# Patient Record
Sex: FEMALE | Race: BLACK OR AFRICAN AMERICAN | NOT HISPANIC OR LATINO | Employment: FULL TIME | URBAN - METROPOLITAN AREA
[De-identification: names, ages, dates, MRNs, and addresses within clinical notes are randomized per-mention and may not be internally consistent; named-entity substitution may affect disease eponyms.]

---

## 2017-07-17 ENCOUNTER — TELEPHONE (OUTPATIENT)
Dept: OBSTETRICS AND GYNECOLOGY | Facility: CLINIC | Age: 42
End: 2017-07-17

## 2017-07-17 NOTE — TELEPHONE ENCOUNTER
----- Message from Aziza Zelaya sent at 7/17/2017  3:47 PM CDT -----  Contact: self  Pt returning a missed call, she can be reached at [phone].

## 2017-07-17 NOTE — TELEPHONE ENCOUNTER
Returned pt call regarding scheduling her annual exam.  No answer.  Left VM message to return call to clinic.

## 2017-07-17 NOTE — TELEPHONE ENCOUNTER
----- Message from Randee Wadsworth sent at 7/17/2017 10:29 AM CDT -----  Contact: Pt  Pt is calling to schedule annual pap and nothing was available to schedule.  Pt can be reached at [phone].    Thank you

## 2017-07-17 NOTE — TELEPHONE ENCOUNTER
Returned pt call regarding scheduling an annual appt.  Informed pt of appt date and time.  Pt verbalized understanding.  Letter sent out.

## 2017-07-19 ENCOUNTER — TELEPHONE (OUTPATIENT)
Dept: OBSTETRICS AND GYNECOLOGY | Facility: CLINIC | Age: 42
End: 2017-07-19

## 2017-07-19 DIAGNOSIS — N92.0 MENORRHAGIA: ICD-10-CM

## 2017-07-19 NOTE — TELEPHONE ENCOUNTER
----- Message from Aziza Zelaya sent at 7/19/2017  8:48 AM CDT -----  Contact: self  Pt needing her depo Rx refilled, pt use [facility] at [facility] and [place], she can be reached at [phone].

## 2017-07-19 NOTE — TELEPHONE ENCOUNTER
Called pt's pharmacy regarding refills on her depo injection.  Spoke to Katherine at [facility] and instructed her to dispense one refill until the pt comes in for her annual.  Katherine verbalized understanding.  Called pt to inform her of the above info.  Pt verbalized understanding.

## 2017-07-23 RX ORDER — MEDROXYPROGESTERONE ACETATE 150 MG/ML
INJECTION, SUSPENSION INTRAMUSCULAR
Qty: 1 ML | Refills: 0 | Status: SHIPPED | OUTPATIENT
Start: 2017-07-23 | End: 2018-02-16

## 2017-07-24 ENCOUNTER — CLINICAL SUPPORT (OUTPATIENT)
Dept: OBSTETRICS AND GYNECOLOGY | Facility: CLINIC | Age: 42
End: 2017-07-24
Payer: COMMERCIAL

## 2017-07-24 DIAGNOSIS — Z30.42 ON DEPO-PROVERA FOR CONTRACEPTION: Primary | ICD-10-CM

## 2017-07-24 LAB
B-HCG UR QL: NEGATIVE
CTP QC/QA: YES

## 2017-07-24 PROCEDURE — 96372 THER/PROPH/DIAG INJ SC/IM: CPT | Mod: S$GLB,,, | Performed by: OBSTETRICS & GYNECOLOGY

## 2017-07-24 PROCEDURE — 99999 PR PBB SHADOW E&M-EST. PATIENT-LVL I: CPT | Mod: PBBFAC,,,

## 2017-07-24 PROCEDURE — 81025 URINE PREGNANCY TEST: CPT | Mod: QW,S$GLB,, | Performed by: OBSTETRICS & GYNECOLOGY

## 2017-07-24 RX ADMIN — MEDROXYPROGESTERONE ACETATE 150 MG: 150 INJECTION, SUSPENSION INTRAMUSCULAR at 09:07

## 2017-07-24 NOTE — PROGRESS NOTES
Here for Depo Provera Injection, Pregnancy test performed, results negative . Patient with no current complaints of pain prior to or after injection. Advised to wait 5 minutes. Return between October 9 - 23 /2017 for next injection.  PATIENT SUPPLIED MEDICATION.  See medication Card for RX information  Order verified, inspected package,storage verified,expiration verified      Site - [facility]

## 2017-08-17 ENCOUNTER — TELEPHONE (OUTPATIENT)
Dept: OBSTETRICS AND GYNECOLOGY | Facility: CLINIC | Age: 42
End: 2017-08-17

## 2017-08-17 NOTE — TELEPHONE ENCOUNTER
Returned pt call regarding whether or not she can still come in ofr her appt if her cycle has started.  Informed pt that she is not due for a pap smear until 2018 and that she can still come in for her exam.  Pt verbalized understanding.

## 2017-08-17 NOTE — TELEPHONE ENCOUNTER
----- Message from Aziza Zelaya sent at 8/17/2017  4:25 PM CDT -----  Contact: self  Pt needing a call back, she have questions, and can be reached at [phone].

## 2017-08-18 ENCOUNTER — OFFICE VISIT (OUTPATIENT)
Dept: OBSTETRICS AND GYNECOLOGY | Facility: CLINIC | Age: 42
End: 2017-08-18
Attending: OBSTETRICS & GYNECOLOGY
Payer: COMMERCIAL

## 2017-08-18 VITALS
BODY MASS INDEX: 26.54 KG/M2 | DIASTOLIC BLOOD PRESSURE: 70 MMHG | WEIGHT: 165.13 LBS | SYSTOLIC BLOOD PRESSURE: 112 MMHG | HEIGHT: 66 IN

## 2017-08-18 DIAGNOSIS — Z12.31 ENCOUNTER FOR SCREENING MAMMOGRAM FOR BREAST CANCER: ICD-10-CM

## 2017-08-18 DIAGNOSIS — N92.1 MENORRHAGIA WITH IRREGULAR CYCLE: ICD-10-CM

## 2017-08-18 DIAGNOSIS — Z01.419 VISIT FOR GYNECOLOGIC EXAMINATION: Primary | ICD-10-CM

## 2017-08-18 PROCEDURE — 88141 CYTOPATH C/V INTERPRET: CPT | Mod: ,,, | Performed by: PATHOLOGY

## 2017-08-18 PROCEDURE — 87624 HPV HI-RISK TYP POOLED RSLT: CPT

## 2017-08-18 PROCEDURE — 99999 PR PBB SHADOW E&M-EST. PATIENT-LVL III: CPT | Mod: PBBFAC,,, | Performed by: OBSTETRICS & GYNECOLOGY

## 2017-08-18 PROCEDURE — 99396 PREV VISIT EST AGE 40-64: CPT | Mod: S$GLB,,, | Performed by: OBSTETRICS & GYNECOLOGY

## 2017-08-18 PROCEDURE — 88175 CYTOPATH C/V AUTO FLUID REDO: CPT | Performed by: PATHOLOGY

## 2017-08-18 NOTE — PROGRESS NOTES
"CC: Well woman exam    Denisse Steen is a 42 y.o. female  presents for a well woman exam.      She is currently on DepoProvera for contraception and control of bleeding.  When she does not take it, she has heavy menses.  She is interested in surgical options.          Past Medical History:   Diagnosis Date    Abnormal Pap smear     Anemia     Anxiety 2013    Blood transfusion        Past Surgical History:   Procedure Laterality Date    ADENOIDECTOMY      CERVICAL BIOPSY  W/ LOOP ELECTRODE EXCISION      DILATION AND CURETTAGE OF UTERUS      OVARIAN CYST REMOVAL      TONSILLECTOMY      WISDOM TOOTH EXTRACTION         OB History    Para Term  AB Living   2 1     1 1   SAB TAB Ectopic Multiple Live Births   1       1      # Outcome Date GA Lbr Guzman/2nd Weight Sex Delivery Anes PTL Lv   2 SAB            1 Para      Vag-Spont   SAMOSN          Family History   Problem Relation Age of Onset    Cancer Sister      Cervical     Diabetes Mother     Cancer Father        Social History   Substance Use Topics    Smoking status: Never Smoker    Smokeless tobacco: Never Used    Alcohol use Yes      Comment: socially       /70   Ht 5' 6" (1.676 m)   Wt 74.9 kg (165 lb 2 oz)   LMP 2017   BMI 26.65 kg/m²     ROS:  GENERAL: Denies weight gain or weight loss. Feeling well overall.   SKIN: Denies rash or lesions.   HEAD: Denies head injury or headache.   NODES: Denies enlarged lymph nodes.   CHEST: Denies chest pain or shortness of breath.   CARDIOVASCULAR: Denies palpitations or left sided chest pain.   ABDOMEN: No abdominal pain, constipation, diarrhea, nausea, vomiting or rectal bleeding.   URINARY: No frequency, dysuria, hematuria, or burning on urination.  REPRODUCTIVE: See HPI.   BREASTS: The patient performs breast self-examination and denies pain, lumps, or nipple discharge.   HEMATOLOGIC: No easy bruisability or excessive bleeding.  MUSCULOSKELETAL: Denies joint pain or " swelling.   NEUROLOGIC: Denies syncope or weakness.   PSYCHIATRIC: Denies depression, anxiety or mood swings.    Physical Exam:    APPEARANCE: Well nourished, well developed, in no acute distress.  AFFECT: WNL, alert and oriented x 3  SKIN: No acne or hirsutism  NECK: Neck symmetric without masses or thyromegaly  NODES: No inguinal, cervical, axillary, or femoral lymph node enlargement  CHEST: Good respiratory effect  ABDOMEN: Soft.  No tenderness or masses.  No hepatosplenomegaly.  No hernias.  BREASTS: Symmetrical, no skin changes or visible lesions.  No palpable masses, nipple discharge bilaterally.  PELVIC: Normal external genitalia without lesions.  Normal hair distribution.  Adequate perineal body, normal urethral meatus.  Vagina moist and well rugated without lesions or discharge.  Cervix pink, without lesions, discharge or tenderness.  No significant cystocele or rectocele.  Bimanual exam shows uterus to be normal size, regular, mobile and nontender.  Adnexa without masses or tenderness.    EXTREMITIES: No edema.    ASSESSMENT AND PLAN  1. Visit for gynecologic examination  Liquid-based pap smear, screening    HPV High Risk Genotypes, PCR   2. Encounter for screening mammogram for breast cancer  Mammo Digital Screening Bilat with CAD   3. Menorrhagia with irregular cycle  US Pelvis Comp with Transvag NON-OB (xpd       Patient was counseled today on A.C.S. Pap guidelines and recommendations for yearly pelvic exams, pap smears every year due to h/o cervical dysplasia, mammograms and monthly self breast exams; to see her PCP for other health maintenance.     Briefly discussed treatment options.  Last ultrasound was in 2011, will repeat and make management decision based on ultrasound findings.     Return in about 1 year (around 8/18/2018).

## 2017-08-23 LAB
HPV HR 12 DNA CVX QL NAA+PROBE: POSITIVE
HPV16 DNA SPEC QL NAA+PROBE: NEGATIVE
HPV18 DNA SPEC QL NAA+PROBE: NEGATIVE

## 2017-09-06 ENCOUNTER — TELEPHONE (OUTPATIENT)
Dept: OBSTETRICS AND GYNECOLOGY | Facility: CLINIC | Age: 42
End: 2017-09-06

## 2017-09-06 NOTE — TELEPHONE ENCOUNTER
Attempted to contact pt regarding abnormal pap results. No answer, left VM message for the pt to call the clinic back.

## 2017-09-14 ENCOUNTER — TELEPHONE (OUTPATIENT)
Dept: OBSTETRICS AND GYNECOLOGY | Facility: CLINIC | Age: 42
End: 2017-09-14

## 2017-09-14 NOTE — TELEPHONE ENCOUNTER
----- Message from Sarah Cross MD sent at 9/14/2017 11:36 AM CDT -----  Please continue to call the patient to schedule colposcopy.

## 2017-09-14 NOTE — TELEPHONE ENCOUNTER
Contacted pt to schedule colposcopy. Pt informed of her pap results and the purpose of the procedure. Pt given next available appointment with Dr. Cross. Pt instructed to contact the clinic if she has any further questions. Pt verbalized understanding.

## 2017-09-18 ENCOUNTER — HOSPITAL ENCOUNTER (OUTPATIENT)
Dept: RADIOLOGY | Facility: OTHER | Age: 42
Discharge: HOME | End: 2017-09-18
Attending: OBSTETRICS & GYNECOLOGY
Payer: COMMERCIAL

## 2017-09-18 DIAGNOSIS — Z12.31 ENCOUNTER FOR SCREENING MAMMOGRAM FOR BREAST CANCER: ICD-10-CM

## 2017-09-18 DIAGNOSIS — N92.1 MENORRHAGIA WITH IRREGULAR CYCLE: ICD-10-CM

## 2017-09-18 PROCEDURE — 76856 US EXAM PELVIC COMPLETE: CPT | Mod: 26,,, | Performed by: INTERNAL MEDICINE

## 2017-09-18 PROCEDURE — 77067 SCR MAMMO BI INCL CAD: CPT | Mod: TC

## 2017-09-18 PROCEDURE — 76830 TRANSVAGINAL US NON-OB: CPT | Mod: 26,,, | Performed by: INTERNAL MEDICINE

## 2017-09-18 PROCEDURE — 77067 SCR MAMMO BI INCL CAD: CPT | Mod: 26,,, | Performed by: RADIOLOGY

## 2017-09-18 PROCEDURE — 76856 US EXAM PELVIC COMPLETE: CPT | Mod: TC

## 2017-09-19 ENCOUNTER — TELEPHONE (OUTPATIENT)
Dept: OBSTETRICS AND GYNECOLOGY | Facility: CLINIC | Age: 42
End: 2017-09-19

## 2017-09-19 NOTE — TELEPHONE ENCOUNTER
Contacted pt regarding the results of her u/s informed the pt that her u/s shows small fibroids and pt was given Dr. Cross's recommendations. Offer the pt an appointment to consult Dr. Cross regarding he fibroids. Pt informed me that she has a colposcopy scheduled and that she will discuss with Dr. Cross on that visit. Pt instructed to contact the clinic if she has any questions. Pt verbalized understanding.

## 2017-10-11 DIAGNOSIS — N92.0 MENORRHAGIA: ICD-10-CM

## 2017-10-12 RX ORDER — MEDROXYPROGESTERONE ACETATE 150 MG/ML
INJECTION, SUSPENSION INTRAMUSCULAR
Qty: 1 ML | Refills: 0 | Status: SHIPPED | OUTPATIENT
Start: 2017-10-12 | End: 2018-01-04 | Stop reason: SDUPTHER

## 2017-10-30 ENCOUNTER — CLINICAL SUPPORT (OUTPATIENT)
Dept: OBSTETRICS AND GYNECOLOGY | Facility: CLINIC | Age: 42
End: 2017-10-30
Payer: COMMERCIAL

## 2017-10-30 ENCOUNTER — PROCEDURE VISIT (OUTPATIENT)
Dept: OBSTETRICS AND GYNECOLOGY | Facility: CLINIC | Age: 42
End: 2017-10-30
Attending: OBSTETRICS & GYNECOLOGY
Payer: COMMERCIAL

## 2017-10-30 VITALS
SYSTOLIC BLOOD PRESSURE: 114 MMHG | DIASTOLIC BLOOD PRESSURE: 76 MMHG | BODY MASS INDEX: 26.61 KG/M2 | WEIGHT: 165.56 LBS | HEIGHT: 66 IN

## 2017-10-30 DIAGNOSIS — R87.619 ABNORMAL CERVICAL PAPANICOLAOU SMEAR, UNSPECIFIED ABNORMAL PAP FINDING: ICD-10-CM

## 2017-10-30 DIAGNOSIS — R87.612 LGSIL ON PAP SMEAR OF CERVIX: Primary | ICD-10-CM

## 2017-10-30 DIAGNOSIS — Z30.42 ON DEPO-PROVERA FOR CONTRACEPTION: Primary | ICD-10-CM

## 2017-10-30 LAB
B-HCG UR QL: NEGATIVE
B-HCG UR QL: NEGATIVE
CTP QC/QA: YES
CTP QC/QA: YES

## 2017-10-30 PROCEDURE — 99999 PR PBB SHADOW E&M-EST. PATIENT-LVL I: CPT | Mod: PBBFAC,,,

## 2017-10-30 PROCEDURE — 96372 THER/PROPH/DIAG INJ SC/IM: CPT | Mod: S$GLB,,, | Performed by: OBSTETRICS & GYNECOLOGY

## 2017-10-30 PROCEDURE — 81025 URINE PREGNANCY TEST: CPT | Mod: S$GLB,,, | Performed by: OBSTETRICS & GYNECOLOGY

## 2017-10-30 PROCEDURE — 88305 TISSUE EXAM BY PATHOLOGIST: CPT | Performed by: PATHOLOGY

## 2017-10-30 PROCEDURE — 57456 ENDOCERV CURETTAGE W/SCOPE: CPT | Mod: S$GLB,,, | Performed by: OBSTETRICS & GYNECOLOGY

## 2017-10-30 PROCEDURE — 88305 TISSUE EXAM BY PATHOLOGIST: CPT | Mod: 26,,, | Performed by: PATHOLOGY

## 2017-10-30 RX ORDER — ISOTRETINOIN 40 MG/1
CAPSULE ORAL
COMMUNITY
Start: 2017-10-23 | End: 2018-02-16

## 2017-10-30 RX ADMIN — MEDROXYPROGESTERONE ACETATE 150 MG: 150 INJECTION, SUSPENSION INTRAMUSCULAR at 10:10

## 2017-10-30 NOTE — PROCEDURES
Procedures     CC: Presents for COLPOSCOPY:    Denisse Steen is a 42 y.o. female who presents for a colposcopy secondary to abnormal pap smear.      UPT is negative.    INDICATIONS: Her most recent papsmear showed: low-grade squamous intraepithelial neoplasia (LGSIL - encompassing HPV,mild dysplasia,CHAPO I)    She does have a history of abnormal pap smears.      PRE-COLPOSCOPY PROCEDURE COUNSELING:  Discussed the abnormal pap test findings, HPV, need for colposcopy and possible biopsies to determine a diagnosis and plan of care, treatments available, the minimal risks of bleeding and infection with a colposcopy, alternatives to colposcopy and she agrees to proceed.  Patient was given the opportunity to ask questions and verbal consent was obtained.        COLPOSCOPY EXAM:   TIME OUT PERFORMED.     no visible lesions    Biopsy was not taken.  ECC was performed.  Minimal blood loss.        The speculum was removed. The patient tolerated the procedure well.  There were no complications.      IMPRESSION:   Abnormal Pap    POST COLPOSCOPY COUNSELING:   Manage post colposcopy cramping with NSAIDs, Tylenol or Rx per [employer].  Avoid anything in vagina (intercourse, douching, tampons) one week after the procedure.  Expect a clumpy blackish vaginal discharge (Monsel's solution) for several days.  Report bleeding heavier than a period, worsening pain, fever > 101.0 F, or foul-smelling vaginal discharge.  HPV vaccine recommended according to FDA age guidelines.  Importance of follow-up stressed.    Counseling lasted approximately 15 minutes and all her questions were answered.    FOLLOW-UP:   In 12 months.

## 2017-10-30 NOTE — PROGRESS NOTES
Pregnancy test needed to be performed, arrived out of timeframe. Here for Depo Provera Injection,  UPT obtained with Negative  results . Patient with no current complaints of pain prior to or after injection. Advised to wait 5 minutes. Return between January 15 - 29 /2017 for next injection.  PATIENT SUPPLIED MEDICATION.  See medication Card for RX information  Order verified, inspected package,storage verified,expiration verified      Site -

## 2017-11-02 ENCOUNTER — TELEPHONE (OUTPATIENT)
Dept: OBSTETRICS AND GYNECOLOGY | Facility: CLINIC | Age: 42
End: 2017-11-02

## 2017-11-02 NOTE — TELEPHONE ENCOUNTER
Contacted the pt to inform her that her colpo is consistent with her pap results and that she is to repeat her pap in one year. No answer, left VM message for the pt to call the clinic back.

## 2017-11-02 NOTE — TELEPHONE ENCOUNTER
Pt returned call to the clinic. Informed the pt that her colpo is consistent with her pap results and that she is to repeat her pap in one year. Pt inquired if she can speak with Dr. Cross regarding her fibroids and what options she has. Informed the pt that I can schedule her for a fibroids consult. Pt agreed to appointment on 12/7 at 9AM. Pt had no further questions and verbalized understanding. Letter sent out.

## 2017-12-07 ENCOUNTER — OFFICE VISIT (OUTPATIENT)
Dept: OBSTETRICS AND GYNECOLOGY | Facility: CLINIC | Age: 42
End: 2017-12-07
Attending: OBSTETRICS & GYNECOLOGY
Payer: COMMERCIAL

## 2017-12-07 VITALS
WEIGHT: 164 LBS | DIASTOLIC BLOOD PRESSURE: 84 MMHG | SYSTOLIC BLOOD PRESSURE: 122 MMHG | BODY MASS INDEX: 26.36 KG/M2 | HEIGHT: 66 IN

## 2017-12-07 DIAGNOSIS — N92.1 MENORRHAGIA WITH IRREGULAR CYCLE: Primary | ICD-10-CM

## 2017-12-07 PROCEDURE — 99213 OFFICE O/P EST LOW 20 MIN: CPT | Mod: S$GLB,,, | Performed by: OBSTETRICS & GYNECOLOGY

## 2017-12-07 PROCEDURE — 99999 PR PBB SHADOW E&M-EST. PATIENT-LVL III: CPT | Mod: PBBFAC,,, | Performed by: OBSTETRICS & GYNECOLOGY

## 2017-12-07 NOTE — PROGRESS NOTES
"Chief complaint: Menorrhagia    Denisse Steen is a 42 y.o. female  presents to discuss treatment options for menorrhagia.    She is currently on DepoProvera for contraception and control of bleeding.  When she does not take it, she has heavy menses.  She is interested in surgical options. She is currently on DepoProvera for contraception and control of bleeding.  When she does not take it, she has heavy menses.  She is interested in surgical options.     Ultrasound shows:    The uterus is not enlarged and measures 8.1 x 4.2 x 5.6 cm.  The endometrial stripe is normal in thickness, 2 mm.  There is heterogeneity of the myometrium.  There  are 2 intramural fibroids, 1.7 x 1.4 x 2 cm and 0.9 cm.  There is no submucosal fibroid.    The ovaries have a normal size and appearance.  Right ovary measures 2.8 x 1.7 x 2.3 cm and the left ovary measures 3.5 x 1.4 x 3.8 cm.    /84   Ht 5' 6" (1.676 m)   Wt 74.4 kg (164 lb 0.4 oz)   LMP 2017   BMI 26.47 kg/m²     ROS:  GENERAL: No fever, chills, fatigability or weight loss.  VULVAR: No pain, no lesions and no itching.  VAGINAL: No relaxation, no itching, no discharge, reports abnormal bleeding and no lesions.  ABDOMEN: No abdominal pain. Denies nausea. Denies vomiting. No diarrhea. No constipation  BREAST: Denies pain. No lumps. No discharge.  URINARY: No incontinence, no nocturia, no frequency and no dysuria.  CARDIOVASCULAR: No chest pain. No shortness of breath. No leg cramps.  NEUROLOGICAL: No headaches. No vision changes.    PHYSICAL EXAM:    Deferred    1. Menorrhagia with irregular cycle       Discussed that the ultrasound shows very small fibroids but they are not in a place that should cause abnormal bleeding.   We discussed treatment options: Mirena IUD, endometrial ablation or hysterectomy.  Patient desires Mirena IUD.  However, if it is not covered by insurance, she desires endometrial ablation.    Authorization submitted  "

## 2017-12-13 ENCOUNTER — TELEPHONE (OUTPATIENT)
Dept: OBSTETRICS AND GYNECOLOGY | Facility: CLINIC | Age: 42
End: 2017-12-13

## 2017-12-13 NOTE — TELEPHONE ENCOUNTER
----- Message from Aziza Zelaya sent at 12/13/2017  9:36 AM CST -----  Contact: Vasquez/ [facility] [phone] EXT [phone]  Vasquez is needing more information regarding this pt, he can be reached at [phone] EXT [phone].

## 2017-12-13 NOTE — TELEPHONE ENCOUNTER
Returned call to Vasquez. He needed the device type and the diagnosis code. Informed him that the pt would like the Mirena and that the code was Z30.430. Vasquez verbalized understanding.

## 2017-12-20 ENCOUNTER — TELEPHONE (OUTPATIENT)
Dept: OBSTETRICS AND GYNECOLOGY | Facility: CLINIC | Age: 42
End: 2017-12-20

## 2017-12-20 NOTE — TELEPHONE ENCOUNTER
Returned the pt's call to the clinic. Informed the pt that her Mirena device is covered 100% through buy and bill and that I can get her scheduled for the insertion. Pt informed me that she think she would like to have the hysterectomy. Informed the pt that I will send Dr. Cross a message regarding her request and contact her once Dr. Cross responds. Pt verbalized understanding.        Pt considering a hysterectomy instead of an IUD. Can you offer this pt some Sx dates?

## 2017-12-20 NOTE — TELEPHONE ENCOUNTER
Contacted the pt to schedule her Mirena insertion. No answer, let VM message for the pt to call the clinic back to schedule her appointment.

## 2017-12-22 ENCOUNTER — TELEPHONE (OUTPATIENT)
Dept: OBSTETRICS AND GYNECOLOGY | Facility: CLINIC | Age: 42
End: 2017-12-22

## 2017-12-22 NOTE — TELEPHONE ENCOUNTER
Contacted the pt to offer her the Sx dates of Feb 20th or Feb 27th. Pt agreed to have Sx on Feb 20th. Pt scheduled for pre-op and pre-admit appointments. Pt informed that letter reminders will be sent out. Pt verbalized understanding. Letter sent.      Pt agreed to Sx on 2/20th.

## 2017-12-22 NOTE — TELEPHONE ENCOUNTER
Contacted the pt to offer her the Sx dates of January 23rd and January 30th. Pt inquired if she can be offered a Sx date after [place]. Informed the pt that I will send her request to Dr. Cross and contact her once Dr. Cross responds. Pt verbalized understanding.      Can you offer this pt a Sx date after [place]?

## 2017-12-26 DIAGNOSIS — N92.0 MENORRHAGIA WITH REGULAR CYCLE: Primary | ICD-10-CM

## 2017-12-26 DIAGNOSIS — N92.0 MENORRHAGIA: ICD-10-CM

## 2017-12-26 RX ORDER — SODIUM CHLORIDE, SODIUM LACTATE, POTASSIUM CHLORIDE, CALCIUM CHLORIDE 600; 310; 30; 20 MG/100ML; MG/100ML; MG/100ML; MG/100ML
INJECTION, SOLUTION INTRAVENOUS CONTINUOUS
Status: CANCELLED | OUTPATIENT
Start: 2017-12-26

## 2018-01-04 DIAGNOSIS — N92.0 MENORRHAGIA: ICD-10-CM

## 2018-01-04 RX ORDER — MEDROXYPROGESTERONE ACETATE 150 MG/ML
INJECTION, SUSPENSION INTRAMUSCULAR
Qty: 1 ML | Refills: 0 | Status: SHIPPED | OUTPATIENT
Start: 2018-01-04 | End: 2018-02-16 | Stop reason: SDUPTHER

## 2018-01-25 ENCOUNTER — TELEPHONE (OUTPATIENT)
Dept: OBSTETRICS AND GYNECOLOGY | Facility: CLINIC | Age: 43
End: 2018-01-25

## 2018-01-25 NOTE — TELEPHONE ENCOUNTER
----- Message from Jonas Bird sent at 1/25/2018  9:40 AM CST -----  PLEASE CALL PT SHE SAYS IT IS IMPORTANT  [phone]

## 2018-01-25 NOTE — LETTER
January 28, 2018    Denisse Steen  [address]  [place] [address]         [facility] [address]  [address] [facility] [address]  [place] [address]  Phone: [phone]  Fax: [phone] January 28, 2018     Patient: Denisse Steen   YOB: 1975   Date of Visit: 1/25/2018       To Whom It May Concern:    It is my medical opinion that Denisse Steen is scheduled for surgery on Feburary 20, 2018.  She will require a 4-6 week recovery time.    If you have any questions or concerns, please don't hesitate to call.    Sincerely,        Sarah Cross MD

## 2018-01-25 NOTE — TELEPHONE ENCOUNTER
Returned the pt's call to the clinic. Pt requesting a letter for her job stating that she is having Sx and how long her recovery period will be. Informed the pt that a message will be sent to Dr. Cross and that she will be contacted once Dr. Cross responds. Pt verbalized understanding.      Can you write a letter stating that the pt is having Sx and how long her recovery period will be?

## 2018-01-30 ENCOUNTER — TELEPHONE (OUTPATIENT)
Dept: OBSTETRICS AND GYNECOLOGY | Facility: CLINIC | Age: 43
End: 2018-01-30

## 2018-01-30 NOTE — TELEPHONE ENCOUNTER
----- Message from Cheryle Ferrari sent at 1/30/2018 12:04 PM CST -----  X. _1st Request  _  2nd Request  _  3rd Request    Who:CHATA GONZALEZ [[id number]]    Why:Patient was returning missed call back from the staff     What Number to Call Back:[phone]    When to Expect a call back: (Before the end of the day)   -- if call after 3:00 call back will be tomorrow.

## 2018-01-30 NOTE — TELEPHONE ENCOUNTER
Returned the pt's call. Informed the pt that the letter that she requested is available for  or if she would like it faxed somewhere it can be faxed. Pt asked that it just be placed in the mail. Informed the pt that the letter has been placed to be mailed. Pt verbalized understanding. Letter sent out.

## 2018-02-15 ENCOUNTER — TELEPHONE (OUTPATIENT)
Dept: OBSTETRICS AND GYNECOLOGY | Facility: CLINIC | Age: 43
End: 2018-02-15

## 2018-02-15 NOTE — TELEPHONE ENCOUNTER
----- Message from Aziza Zelaya sent at 2/15/2018 10:59 AM CST -----  Contact: self  Pt needing a call back, she have questions and can be reached at [phone].

## 2018-02-15 NOTE — TELEPHONE ENCOUNTER
Returned the pt's call to the clinic. Pt inquired if she can move her Sx to 2/21 instead of 2/20. Informed the pt that Dr. Cross doesn't have anymore Sx time for February. Pt stated that she will keep her scheduled Sx on 2/20. Pt verbalized understanding.

## 2018-02-16 ENCOUNTER — OFFICE VISIT (OUTPATIENT)
Dept: OBSTETRICS AND GYNECOLOGY | Facility: CLINIC | Age: 43
End: 2018-02-16
Attending: OBSTETRICS & GYNECOLOGY
Payer: COMMERCIAL

## 2018-02-16 ENCOUNTER — HOSPITAL ENCOUNTER (OUTPATIENT)
Dept: PREADMISSION TESTING | Facility: OTHER | Age: 43
Discharge: HOME | End: 2018-02-16
Attending: OBSTETRICS & GYNECOLOGY
Payer: COMMERCIAL

## 2018-02-16 ENCOUNTER — ANESTHESIA EVENT (OUTPATIENT)
Dept: SURGERY | Facility: OTHER | Age: 43
End: 2018-02-16
Payer: COMMERCIAL

## 2018-02-16 VITALS
WEIGHT: 163 LBS | SYSTOLIC BLOOD PRESSURE: 122 MMHG | HEART RATE: 58 BPM | DIASTOLIC BLOOD PRESSURE: 80 MMHG | RESPIRATION RATE: 16 BRPM | BODY MASS INDEX: 26.2 KG/M2 | TEMPERATURE: 99 F | OXYGEN SATURATION: 97 % | HEIGHT: 66 IN

## 2018-02-16 VITALS
BODY MASS INDEX: 26.47 KG/M2 | HEIGHT: 66 IN | DIASTOLIC BLOOD PRESSURE: 82 MMHG | WEIGHT: 164.69 LBS | SYSTOLIC BLOOD PRESSURE: 118 MMHG

## 2018-02-16 DIAGNOSIS — N92.1 MENORRHAGIA WITH IRREGULAR CYCLE: ICD-10-CM

## 2018-02-16 DIAGNOSIS — Z01.818 PREOPERATIVE EXAM FOR GYNECOLOGIC SURGERY: Primary | ICD-10-CM

## 2018-02-16 LAB
ABO + RH BLD: NORMAL
BASOPHILS # BLD AUTO: 0.01 K/UL
BASOPHILS NFR BLD: 0.2 %
BLD GP AB SCN CELLS X3 SERPL QL: NORMAL
DIFFERENTIAL METHOD: ABNORMAL
EOSINOPHIL # BLD AUTO: 0.1 K/UL
EOSINOPHIL NFR BLD: 1.3 %
ERYTHROCYTE [DISTWIDTH] IN BLOOD BY AUTOMATED COUNT: 12.8 %
HCT VFR BLD AUTO: 36.8 %
HGB BLD-MCNC: 12.1 G/DL
LYMPHOCYTES # BLD AUTO: 2.4 K/UL
LYMPHOCYTES NFR BLD: 51.4 %
MCH RBC QN AUTO: 32.6 PG
MCHC RBC AUTO-ENTMCNC: 32.9 G/DL
MCV RBC AUTO: 99 FL
MONOCYTES # BLD AUTO: 0.4 K/UL
MONOCYTES NFR BLD: 7.9 %
NEUTROPHILS # BLD AUTO: 1.8 K/UL
NEUTROPHILS NFR BLD: 39.2 %
PLATELET # BLD AUTO: 214 K/UL
PMV BLD AUTO: 10.5 FL
RBC # BLD AUTO: 3.71 M/UL
WBC # BLD AUTO: 4.69 K/UL

## 2018-02-16 PROCEDURE — 99999 PR PBB SHADOW E&M-EST. PATIENT-LVL II: CPT | Mod: PBBFAC,,, | Performed by: OBSTETRICS & GYNECOLOGY

## 2018-02-16 PROCEDURE — 36415 COLL VENOUS BLD VENIPUNCTURE: CPT

## 2018-02-16 PROCEDURE — 99499 UNLISTED E&M SERVICE: CPT | Mod: S$GLB,,, | Performed by: OBSTETRICS & GYNECOLOGY

## 2018-02-16 PROCEDURE — 85025 COMPLETE CBC W/AUTO DIFF WBC: CPT

## 2018-02-16 PROCEDURE — 86901 BLOOD TYPING SEROLOGIC RH(D): CPT

## 2018-02-16 RX ORDER — OXYCODONE HYDROCHLORIDE 5 MG/1
5 TABLET ORAL ONCE AS NEEDED
Status: CANCELLED | OUTPATIENT
Start: 2018-02-16 | End: 2018-02-16

## 2018-02-16 RX ORDER — SODIUM CHLORIDE, SODIUM LACTATE, POTASSIUM CHLORIDE, CALCIUM CHLORIDE 600; 310; 30; 20 MG/100ML; MG/100ML; MG/100ML; MG/100ML
INJECTION, SOLUTION INTRAVENOUS CONTINUOUS
Status: CANCELLED | OUTPATIENT
Start: 2018-02-16

## 2018-02-16 RX ORDER — FAMOTIDINE 20 MG/1
20 TABLET, FILM COATED ORAL
Status: CANCELLED | OUTPATIENT
Start: 2018-02-16 | End: 2018-02-16

## 2018-02-16 RX ORDER — MIDAZOLAM HYDROCHLORIDE 5 MG/ML
4 INJECTION INTRAMUSCULAR; INTRAVENOUS
Status: CANCELLED | OUTPATIENT
Start: 2018-02-16

## 2018-02-16 RX ORDER — LIDOCAINE HYDROCHLORIDE 10 MG/ML
0.5 INJECTION, SOLUTION EPIDURAL; INFILTRATION; INTRACAUDAL; PERINEURAL ONCE
Status: CANCELLED | OUTPATIENT
Start: 2018-02-16 | End: 2018-02-16

## 2018-02-16 NOTE — DISCHARGE INSTRUCTIONS
PRE-ADMIT TESTING -  [phone]    [address]  [place] [facility]          Your surgery has been scheduled at [facility]. We are pleased to have the opportunity to serve you. For Further Information please call [phone].    On the day of surgery please report to the Information Desk on the 1st floor.    · CONTACT YOUR PHYSICIAN'S OFFICE THE DAY PRIOR TO YOUR SURGERY TO OBTAIN YOUR ARRIVAL TIME.     · The evening before surgery do not eat anything after 9 p.m. ( this includes hard candy, chewing gum and mints).  You may only have GATORADE, POWERADE AND WATER  from 9 p.m. until you leave your home.   DO NOT DRINK ANY LIQUIDS ON THE WAY TO THE HOSPITAL.      SPECIAL MEDICATION INSTRUCTIONS: TAKE medications checked off by the Anesthesiologist on your Medication List.    Angiogram Patients: Take medications as instructed by your physician, including aspirin.     Surgery Patients:    If you take ASPIRIN - Your PHYSICIAN/SURGEON will need to inform you IF/OR when you need to stop taking aspirin prior to your surgery.     Do Not take any medications containing IBUPROFEN.  Do Not Wear any make-up or dark nail polish   (especially eye make-up) to surgery. If you come to surgery with makeup on you will be required to remove the makeup or nail polish.    Do not shave your surgical area at least 5 days prior to your surgery. The surgical prep will be performed at the hospital according to Infection Control regulations.    Leave all valuables at home.   Do Not wear any jewelry or watches, including any metal in body piercings.  Contact Lens must be removed before surgery. Either do not wear the contact lens or bring a case and solution for storage.  Please bring a container for eyeglasses or dentures as required.  Bring any paperwork your physician has provided, such as consent forms,  history and physicals, doctor's orders, etc.   Bring comfortable clothes that are loose fitting to wear upon  discharge. Take into consideration the type of surgery being performed.  Maintain your diet as advised per your physician the day prior to surgery.      Adequate rest the night before surgery is advised.   Park in the Parking lot behind the hospital or in the [place] [facility] across the street from the parking lot. Parking is complimentary.  If you will be discharged the same day as your procedure, please arrange for a responsible adult to drive you home or to accompany you if traveling by taxi.   YOU WILL NOT BE PERMITTED TO DRIVE OR TO LEAVE THE HOSPITAL ALONE AFTER SURGERY.   It is strongly recommended that you arrange for someone to remain with you for the first 24 hrs following your surgery.       Thank you for your cooperation.  The Staff of [facility].        Bathing Instructions                                                                 Please shower the evening before and morning of your procedure with    ANTIBACTERIAL SOAP. ( DIAL, etc )  Concentrate on the surgical area   for at least 3 minutes and rinse completely. Dry off as usual.   Do not use any deodorant, powder, body lotions, perfume, after shave or    cologne.

## 2018-02-16 NOTE — PROGRESS NOTES
CC: Preop exam    Denisse Steen is a 42 y.o. female  presents for a pre-op exam for a TLH, bilateral salpingectomy, cystoscopy scheduled on .      She is currently on DepoProvera for contraception and control of bleeding.  When she does not take it, she has heavy menses.  She is interested in surgical options. She is currently on DepoProvera for contraception and control of bleeding.  When she does not take it, she has heavy menses.  She is interested in surgical options.      Ultrasound shows:     The uterus is not enlarged and measures 8.1 x 4.2 x 5.6 cm.  The endometrial stripe is normal in thickness, 2 mm.  There is heterogeneity of the myometrium.  There  are 2 intramural fibroids, 1.7 x 1.4 x 2 cm and 0.9 cm.  There is no submucosal fibroid.    The ovaries have a normal size and appearance.  Right ovary measures 2.8 x 1.7 x 2.3 cm and the left ovary measures 3.5 x 1.4 x 3.8 cm.    Past Medical History:   Diagnosis Date    Abnormal Pap smear     Anemia     Anxiety 2013    Blood transfusion        Past Surgical History:   Procedure Laterality Date    ADENOIDECTOMY      CERVICAL BIOPSY  W/ LOOP ELECTRODE EXCISION      DILATION AND CURETTAGE OF UTERUS      OVARIAN CYST REMOVAL      TONSILLECTOMY      WISDOM TOOTH EXTRACTION         OB History    Para Term  AB Living   3 2 1   1 1   SAB TAB Ectopic Multiple Live Births   1       1      # Outcome Date GA Lbr Guzman/2nd Weight Sex Delivery Anes PTL Lv   3 Term            2 SAB            1 Para      Vag-Spont   SAMSON          Family History   Problem Relation Age of Onset    Diabetes Mother     Cancer Father     Cancer Sister      Cervical     Ovarian cancer Neg Hx     Colon cancer Neg Hx     Breast cancer Neg Hx        Social History   Substance Use Topics    Smoking status: Never Smoker    Smokeless tobacco: Never Used    Alcohol use Yes      Comment: socially       /82 (BP Location: Left  "arm, Patient Position: Sitting, BP Method: Small (Manual))   Ht 5' 6" (1.676 m)   Wt 74.7 kg (164 lb 10.9 oz)   LMP 02/02/2018 (Within Days)   BMI 26.58 kg/m²     ROS:  GENERAL: Denies weight gain or weight loss. Feeling well overall.   SKIN: Denies rash or lesions.   HEAD: Denies head injury or headache.   NODES: Denies enlarged lymph nodes.   CHEST: Denies chest pain or shortness of breath.   CARDIOVASCULAR: Denies palpitations or left sided chest pain.   ABDOMEN: No abdominal pain, constipation, diarrhea, nausea, vomiting or rectal bleeding.   URINARY: No frequency, dysuria, hematuria, or burning on urination.  REPRODUCTIVE: See HPI.   HEMATOLOGIC: No easy bruisability or excessive bleeding with the exception of menstrual cycles.  MUSCULOSKELETAL: Denies joint pain or swelling.   NEUROLOGIC: Denies syncope or weakness.   PSYCHIATRIC: Denies depression, anxiety or mood swings.    PHYSICAL EXAM:  APPEARANCE: Well nourished, well developed, in no acute distress.  AFFECT: WNL, alert and oriented x 3  SKIN: No acne or hirsutism  NECK: Neck symmetric without masses or thyromegaly  NODES: No inguinal, cervical, axillary, or femoral lymph node enlargement  CHEST: Good respiratory effect  ABDOMEN: Soft.  No tenderness or masses.  No hepatosplenomegaly.  No hernias.  PELVIC: Deferred  EXTREMITIES: No edema.      ICD-10-CM ICD-9-CM    1. Preoperative exam for gynecologic surgery Z01.818 V72.83    2. Menorrhagia with irregular cycle N92.1 626.2        We discussed treatment options: Mirena IUD, endometrial ablation or hysterectomy.  Patient previously desired Mirena IUD or ablation but due to dysmenorrhea, she desires hysterectomy.  Discussed options for routes of hysterectomy - vaginal, TLH, robotic hyst, abdominal hysterectomy.  Discussed that minimally invasive option is desirable.  Discussed lack of decensus on exam.  Therefore, recommended a laparoscopic hysterectomy.  Recommended BS for risk reduction for ovarian " cancer but recommended ovarian preservation due to age.  ESPERANZA HAMILTON scheduled for Tuesday, February 20.      I have discussed the risks, benefits, indications, and alternatives of the procedure in detail.  The patient verbalizes her understanding.  All questions answered.  Consents signed.  The patient agrees to proceed to proceed as planned.

## 2018-02-16 NOTE — ANESTHESIA PREPROCEDURE EVALUATION
02/16/2018  Denisse Steen is a 42 y.o., female.    Anesthesia Evaluation    I have reviewed the Patient Summary Reports.    I have reviewed the Nursing Notes.   I have reviewed the Medications.     Review of Systems  Anesthesia Hx:  No problems with previous Anesthesia    Social:  Social Alcohol Use, Non-Smoker    Hematology/Oncology:     Oncology Normal    -- Anemia:   EENT/Dental:EENT/Dental Normal   Cardiovascular:  Cardiovascular Normal Exercise tolerance: good     Pulmonary:  Pulmonary Normal    Renal/:  Renal/ Normal     Hepatic/GI:  Hepatic/GI Normal    Musculoskeletal:  Musculoskeletal Normal    Neurological:  Neurology Normal    Endocrine:  Endocrine Normal    Dermatological:  Skin Normal    Psych:  Psychiatric Normal           Physical Exam  General:  Well nourished    Airway/Jaw/Neck:  Airway Findings: Mouth Opening: Normal Tongue: Normal  General Airway Assessment: Adult, Average  Mallampati: II  TM Distance: Normal, at least 6 cm  Jaw/Neck Findings:  Neck ROM: Normal ROM      Dental:  Dental Findings: In tact        Mental Status:  Mental Status Findings:  Cooperative, Alert and Oriented         Anesthesia Plan  Type of Anesthesia, risks & benefits discussed:  Anesthesia Type:  general  Patient's Preference:   Intra-op Monitoring Plan: standard ASA monitors  Intra-op Monitoring Plan Comments:   Post Op Pain Control Plan:   Post Op Pain Control Plan Comments:   Induction:    Beta Blocker:         Informed Consent: Patient understands risks and agrees with Anesthesia plan.  Questions answered. Anesthesia consent signed with patient.  ASA Score: 2     Day of Surgery Review of History & Physical:    H&P update referred to the surgeon.     Anesthesia Plan Notes: CBC and T&S.        Ready For Surgery From Anesthesia Perspective.

## 2018-02-19 ENCOUNTER — TELEPHONE (OUTPATIENT)
Dept: OBSTETRICS AND GYNECOLOGY | Facility: CLINIC | Age: 43
End: 2018-02-19

## 2018-02-19 NOTE — TELEPHONE ENCOUNTER
----- Message from Ricardo Gipson sent at 2/19/2018  3:00 PM CST -----  Contact: Pt  _x  1st Request  _  2nd Request  _  3rd Request        Who: CHATA GONZALEZ [[id number]]    Why: Requesting a call back in regards to discussing concerns she has regarding procedure.   Please return the call at earliest convenience. Thanks!    What Number to Call Back:[phone]    When to Expect a call back: (Within 24 hours)

## 2018-02-19 NOTE — TELEPHONE ENCOUNTER
Returned the pt's call to the clinic. Informed the pt that she is to report for Sx on 2/20 for 8AM. Pt stated that she would like for Dr. Cross to call her because she is having a little anxiety about her Sx. Informed the pt that Dr. Cross is currently in Sx but that a message will be sent to Dr. Cross regarding her request. Pt verbalized understanding.    Pt is requesting that you call her because she is having a little anxiety about her Sx. Please advise.

## 2018-02-19 NOTE — TELEPHONE ENCOUNTER
Contacted the pt to inform her to report for Sx tomorrow on 2/20 for 8AM. No answer, left VM message for the pt regarding this information.

## 2018-02-20 ENCOUNTER — ANESTHESIA (OUTPATIENT)
Dept: SURGERY | Facility: OTHER | Age: 43
End: 2018-02-20
Payer: COMMERCIAL

## 2018-02-20 ENCOUNTER — HOSPITAL ENCOUNTER (OUTPATIENT)
Facility: OTHER | Age: 43
Discharge: HOME | End: 2018-02-20
Attending: OBSTETRICS & GYNECOLOGY | Admitting: OBSTETRICS & GYNECOLOGY
Payer: COMMERCIAL

## 2018-02-20 VITALS
WEIGHT: 163 LBS | HEIGHT: 66 IN | TEMPERATURE: 98 F | DIASTOLIC BLOOD PRESSURE: 69 MMHG | SYSTOLIC BLOOD PRESSURE: 115 MMHG | OXYGEN SATURATION: 99 % | RESPIRATION RATE: 16 BRPM | BODY MASS INDEX: 26.2 KG/M2 | HEART RATE: 56 BPM

## 2018-02-20 DIAGNOSIS — N92.0 MENORRHAGIA WITH REGULAR CYCLE: ICD-10-CM

## 2018-02-20 DIAGNOSIS — N92.0 MENORRHAGIA: ICD-10-CM

## 2018-02-20 DIAGNOSIS — Z90.710 S/P LAPAROSCOPIC HYSTERECTOMY: Primary | ICD-10-CM

## 2018-02-20 LAB
B-HCG UR QL: NEGATIVE
CTP QC/QA: YES
POCT GLUCOSE: 87 MG/DL (ref 70–110)

## 2018-02-20 PROCEDURE — 71000033 HC RECOVERY, INTIAL HOUR: Performed by: OBSTETRICS & GYNECOLOGY

## 2018-02-20 PROCEDURE — 25000003 PHARM REV CODE 250: Performed by: SPECIALIST

## 2018-02-20 PROCEDURE — 63600175 PHARM REV CODE 636 W HCPCS: Performed by: NURSE ANESTHETIST, CERTIFIED REGISTERED

## 2018-02-20 PROCEDURE — 71000016 HC POSTOP RECOV ADDL HR: Performed by: OBSTETRICS & GYNECOLOGY

## 2018-02-20 PROCEDURE — 25000003 PHARM REV CODE 250: Performed by: OBSTETRICS & GYNECOLOGY

## 2018-02-20 PROCEDURE — 58571 TLH W/T/O 250 G OR LESS: CPT | Mod: ,,, | Performed by: OBSTETRICS & GYNECOLOGY

## 2018-02-20 PROCEDURE — 25000003 PHARM REV CODE 250: Performed by: ANESTHESIOLOGY

## 2018-02-20 PROCEDURE — 25000003 PHARM REV CODE 250: Performed by: NURSE ANESTHETIST, CERTIFIED REGISTERED

## 2018-02-20 PROCEDURE — 88309 TISSUE EXAM BY PATHOLOGIST: CPT | Mod: 26,,, | Performed by: PATHOLOGY

## 2018-02-20 PROCEDURE — 71000039 HC RECOVERY, EACH ADD'L HOUR: Performed by: OBSTETRICS & GYNECOLOGY

## 2018-02-20 PROCEDURE — 27201423 OPTIME MED/SURG SUP & DEVICES STERILE SUPPLY: Performed by: OBSTETRICS & GYNECOLOGY

## 2018-02-20 PROCEDURE — 82962 GLUCOSE BLOOD TEST: CPT | Performed by: OBSTETRICS & GYNECOLOGY

## 2018-02-20 PROCEDURE — 71000015 HC POSTOP RECOV 1ST HR: Performed by: OBSTETRICS & GYNECOLOGY

## 2018-02-20 PROCEDURE — 36000711: Performed by: OBSTETRICS & GYNECOLOGY

## 2018-02-20 PROCEDURE — 36000710: Performed by: OBSTETRICS & GYNECOLOGY

## 2018-02-20 PROCEDURE — 88309 TISSUE EXAM BY PATHOLOGIST: CPT | Performed by: PATHOLOGY

## 2018-02-20 PROCEDURE — 63600175 PHARM REV CODE 636 W HCPCS: Performed by: OBSTETRICS & GYNECOLOGY

## 2018-02-20 PROCEDURE — 63600175 PHARM REV CODE 636 W HCPCS: Performed by: SPECIALIST

## 2018-02-20 PROCEDURE — 37000008 HC ANESTHESIA 1ST 15 MINUTES: Performed by: OBSTETRICS & GYNECOLOGY

## 2018-02-20 PROCEDURE — 37000009 HC ANESTHESIA EA ADD 15 MINS: Performed by: OBSTETRICS & GYNECOLOGY

## 2018-02-20 PROCEDURE — 63600175 PHARM REV CODE 636 W HCPCS: Performed by: ANESTHESIOLOGY

## 2018-02-20 PROCEDURE — 81025 URINE PREGNANCY TEST: CPT | Performed by: SPECIALIST

## 2018-02-20 RX ORDER — MIDAZOLAM HYDROCHLORIDE 5 MG/ML
4 INJECTION INTRAMUSCULAR; INTRAVENOUS
Status: DISCONTINUED | OUTPATIENT
Start: 2018-02-20 | End: 2018-02-20 | Stop reason: HOSPADM

## 2018-02-20 RX ORDER — HYDROMORPHONE HYDROCHLORIDE 2 MG/ML
1 INJECTION, SOLUTION INTRAMUSCULAR; INTRAVENOUS; SUBCUTANEOUS EVERY 4 HOURS PRN
Status: DISCONTINUED | OUTPATIENT
Start: 2018-02-20 | End: 2018-02-20 | Stop reason: HOSPADM

## 2018-02-20 RX ORDER — KETOROLAC TROMETHAMINE 30 MG/ML
INJECTION, SOLUTION INTRAMUSCULAR; INTRAVENOUS
Status: DISCONTINUED | OUTPATIENT
Start: 2018-02-20 | End: 2018-02-20

## 2018-02-20 RX ORDER — NEOSTIGMINE METHYLSULFATE 1 MG/ML
INJECTION, SOLUTION INTRAVENOUS
Status: DISCONTINUED | OUTPATIENT
Start: 2018-02-20 | End: 2018-02-20

## 2018-02-20 RX ORDER — DEXAMETHASONE SODIUM PHOSPHATE 4 MG/ML
INJECTION, SOLUTION INTRA-ARTICULAR; INTRALESIONAL; INTRAMUSCULAR; INTRAVENOUS; SOFT TISSUE
Status: DISCONTINUED | OUTPATIENT
Start: 2018-02-20 | End: 2018-02-20

## 2018-02-20 RX ORDER — ONDANSETRON 2 MG/ML
4 INJECTION INTRAMUSCULAR; INTRAVENOUS DAILY PRN
Status: DISCONTINUED | OUTPATIENT
Start: 2018-02-20 | End: 2018-02-20 | Stop reason: HOSPADM

## 2018-02-20 RX ORDER — OXYCODONE HYDROCHLORIDE 5 MG/1
5 TABLET ORAL
Status: DISCONTINUED | OUTPATIENT
Start: 2018-02-20 | End: 2018-02-20 | Stop reason: HOSPADM

## 2018-02-20 RX ORDER — ONDANSETRON HYDROCHLORIDE 2 MG/ML
INJECTION, SOLUTION INTRAMUSCULAR; INTRAVENOUS
Status: DISCONTINUED | OUTPATIENT
Start: 2018-02-20 | End: 2018-02-20

## 2018-02-20 RX ORDER — ROCURONIUM BROMIDE 10 MG/ML
INJECTION, SOLUTION INTRAVENOUS
Status: DISCONTINUED | OUTPATIENT
Start: 2018-02-20 | End: 2018-02-20

## 2018-02-20 RX ORDER — DIPHENHYDRAMINE HYDROCHLORIDE 50 MG/ML
25 INJECTION INTRAMUSCULAR; INTRAVENOUS EVERY 4 HOURS PRN
Status: DISCONTINUED | OUTPATIENT
Start: 2018-02-20 | End: 2018-02-20 | Stop reason: HOSPADM

## 2018-02-20 RX ORDER — GLYCOPYRROLATE 0.2 MG/ML
INJECTION INTRAMUSCULAR; INTRAVENOUS
Status: DISCONTINUED | OUTPATIENT
Start: 2018-02-20 | End: 2018-02-20

## 2018-02-20 RX ORDER — PROPOFOL 10 MG/ML
VIAL (ML) INTRAVENOUS
Status: DISCONTINUED | OUTPATIENT
Start: 2018-02-20 | End: 2018-02-20

## 2018-02-20 RX ORDER — CEFAZOLIN SODIUM 2 G/50ML
2 SOLUTION INTRAVENOUS
Status: COMPLETED | OUTPATIENT
Start: 2018-02-20 | End: 2018-02-20

## 2018-02-20 RX ORDER — MIDAZOLAM HYDROCHLORIDE 5 MG/ML
4 INJECTION INTRAMUSCULAR; INTRAVENOUS
Status: DISCONTINUED | OUTPATIENT
Start: 2018-02-20 | End: 2018-02-20 | Stop reason: SDUPTHER

## 2018-02-20 RX ORDER — BUPIVACAINE HYDROCHLORIDE 2.5 MG/ML
INJECTION, SOLUTION EPIDURAL; INFILTRATION; INTRACAUDAL
Status: DISCONTINUED | OUTPATIENT
Start: 2018-02-20 | End: 2018-02-20 | Stop reason: HOSPADM

## 2018-02-20 RX ORDER — HYDROMORPHONE HYDROCHLORIDE 2 MG/ML
0.4 INJECTION, SOLUTION INTRAMUSCULAR; INTRAVENOUS; SUBCUTANEOUS EVERY 5 MIN PRN
Status: DISCONTINUED | OUTPATIENT
Start: 2018-02-20 | End: 2018-02-20 | Stop reason: HOSPADM

## 2018-02-20 RX ORDER — SODIUM CHLORIDE 0.9 % (FLUSH) 0.9 %
3 SYRINGE (ML) INJECTION
Status: DISCONTINUED | OUTPATIENT
Start: 2018-02-20 | End: 2018-02-20 | Stop reason: HOSPADM

## 2018-02-20 RX ORDER — MIDAZOLAM HYDROCHLORIDE 1 MG/ML
INJECTION INTRAMUSCULAR; INTRAVENOUS
Status: DISCONTINUED | OUTPATIENT
Start: 2018-02-20 | End: 2018-02-20

## 2018-02-20 RX ORDER — HYDROCODONE BITARTRATE AND ACETAMINOPHEN 10; 325 MG/1; MG/1
1 TABLET ORAL EVERY 4 HOURS PRN
Status: DISCONTINUED | OUTPATIENT
Start: 2018-02-20 | End: 2018-02-20 | Stop reason: HOSPADM

## 2018-02-20 RX ORDER — FAMOTIDINE 20 MG/1
20 TABLET, FILM COATED ORAL
Status: DISCONTINUED | OUTPATIENT
Start: 2018-02-20 | End: 2018-02-20 | Stop reason: SDUPTHER

## 2018-02-20 RX ORDER — ONDANSETRON 8 MG/1
8 TABLET, ORALLY DISINTEGRATING ORAL EVERY 8 HOURS PRN
Status: DISCONTINUED | OUTPATIENT
Start: 2018-02-20 | End: 2018-02-20 | Stop reason: HOSPADM

## 2018-02-20 RX ORDER — FAMOTIDINE 20 MG/1
20 TABLET, FILM COATED ORAL
Status: COMPLETED | OUTPATIENT
Start: 2018-02-20 | End: 2018-02-20

## 2018-02-20 RX ORDER — HYDROCODONE BITARTRATE AND ACETAMINOPHEN 5; 325 MG/1; MG/1
1 TABLET ORAL EVERY 4 HOURS PRN
Qty: 40 TABLET | Refills: 0 | Status: SHIPPED | OUTPATIENT
Start: 2018-02-20 | End: 2018-08-14

## 2018-02-20 RX ORDER — SODIUM CHLORIDE, SODIUM LACTATE, POTASSIUM CHLORIDE, CALCIUM CHLORIDE 600; 310; 30; 20 MG/100ML; MG/100ML; MG/100ML; MG/100ML
INJECTION, SOLUTION INTRAVENOUS CONTINUOUS
Status: DISCONTINUED | OUTPATIENT
Start: 2018-02-20 | End: 2018-02-20 | Stop reason: HOSPADM

## 2018-02-20 RX ORDER — LIDOCAINE HYDROCHLORIDE 10 MG/ML
0.5 INJECTION, SOLUTION EPIDURAL; INFILTRATION; INTRACAUDAL; PERINEURAL ONCE
Status: DISCONTINUED | OUTPATIENT
Start: 2018-02-20 | End: 2018-02-20 | Stop reason: HOSPADM

## 2018-02-20 RX ORDER — ACETAMINOPHEN 10 MG/ML
INJECTION, SOLUTION INTRAVENOUS
Status: DISCONTINUED | OUTPATIENT
Start: 2018-02-20 | End: 2018-02-20

## 2018-02-20 RX ORDER — HYDROCODONE BITARTRATE AND ACETAMINOPHEN 5; 325 MG/1; MG/1
1 TABLET ORAL EVERY 4 HOURS PRN
Status: DISCONTINUED | OUTPATIENT
Start: 2018-02-20 | End: 2018-02-20 | Stop reason: HOSPADM

## 2018-02-20 RX ORDER — DIPHENHYDRAMINE HCL 25 MG
25 CAPSULE ORAL EVERY 4 HOURS PRN
Status: DISCONTINUED | OUTPATIENT
Start: 2018-02-20 | End: 2018-02-20 | Stop reason: HOSPADM

## 2018-02-20 RX ORDER — HYDROMORPHONE HYDROCHLORIDE 2 MG/ML
INJECTION, SOLUTION INTRAMUSCULAR; INTRAVENOUS; SUBCUTANEOUS
Status: DISCONTINUED | OUTPATIENT
Start: 2018-02-20 | End: 2018-02-20

## 2018-02-20 RX ORDER — IBUPROFEN 600 MG/1
600 TABLET ORAL EVERY 6 HOURS PRN
Status: DISCONTINUED | OUTPATIENT
Start: 2018-02-20 | End: 2018-02-20 | Stop reason: HOSPADM

## 2018-02-20 RX ORDER — MEPERIDINE HYDROCHLORIDE 50 MG/ML
12.5 INJECTION INTRAMUSCULAR; INTRAVENOUS; SUBCUTANEOUS ONCE AS NEEDED
Status: DISCONTINUED | OUTPATIENT
Start: 2018-02-20 | End: 2018-02-20 | Stop reason: HOSPADM

## 2018-02-20 RX ORDER — FENTANYL CITRATE 50 UG/ML
25 INJECTION, SOLUTION INTRAMUSCULAR; INTRAVENOUS EVERY 5 MIN PRN
Status: DISCONTINUED | OUTPATIENT
Start: 2018-02-20 | End: 2018-02-20 | Stop reason: HOSPADM

## 2018-02-20 RX ORDER — OXYCODONE HYDROCHLORIDE 5 MG/1
5 TABLET ORAL ONCE AS NEEDED
Status: DISCONTINUED | OUTPATIENT
Start: 2018-02-20 | End: 2018-02-20 | Stop reason: HOSPADM

## 2018-02-20 RX ORDER — FENTANYL CITRATE 50 UG/ML
INJECTION, SOLUTION INTRAMUSCULAR; INTRAVENOUS
Status: DISCONTINUED | OUTPATIENT
Start: 2018-02-20 | End: 2018-02-20

## 2018-02-20 RX ORDER — IBUPROFEN 800 MG/1
800 TABLET ORAL EVERY 8 HOURS PRN
Qty: 30 TABLET | Refills: 1 | Status: SHIPPED | OUTPATIENT
Start: 2018-02-20 | End: 2018-08-14

## 2018-02-20 RX ORDER — LIDOCAINE HYDROCHLORIDE 10 MG/ML
0.5 INJECTION, SOLUTION EPIDURAL; INFILTRATION; INTRACAUDAL; PERINEURAL ONCE
Status: DISCONTINUED | OUTPATIENT
Start: 2018-02-20 | End: 2018-02-20 | Stop reason: SDUPTHER

## 2018-02-20 RX ORDER — LIDOCAINE HCL/PF 100 MG/5ML
SYRINGE (ML) INTRAVENOUS
Status: DISCONTINUED | OUTPATIENT
Start: 2018-02-20 | End: 2018-02-20

## 2018-02-20 RX ORDER — SODIUM CHLORIDE, SODIUM LACTATE, POTASSIUM CHLORIDE, CALCIUM CHLORIDE 600; 310; 30; 20 MG/100ML; MG/100ML; MG/100ML; MG/100ML
INJECTION, SOLUTION INTRAVENOUS CONTINUOUS
Status: DISCONTINUED | OUTPATIENT
Start: 2018-02-20 | End: 2018-02-20 | Stop reason: SDUPTHER

## 2018-02-20 RX ADMIN — FENTANYL CITRATE 25 MCG: 50 INJECTION, SOLUTION INTRAMUSCULAR; INTRAVENOUS at 12:02

## 2018-02-20 RX ADMIN — FENTANYL CITRATE 50 MCG: 50 INJECTION, SOLUTION INTRAMUSCULAR; INTRAVENOUS at 10:02

## 2018-02-20 RX ADMIN — HYDROMORPHONE HYDROCHLORIDE 0.5 MG: 2 INJECTION INTRAMUSCULAR; INTRAVENOUS; SUBCUTANEOUS at 11:02

## 2018-02-20 RX ADMIN — CARBOXYMETHYLCELLULOSE SODIUM 2 DROP: 2.5 SOLUTION/ DROPS OPHTHALMIC at 10:02

## 2018-02-20 RX ADMIN — CEFAZOLIN SODIUM 2 G: 2 SOLUTION INTRAVENOUS at 10:02

## 2018-02-20 RX ADMIN — HYDROCODONE BITARTRATE AND ACETAMINOPHEN 1 TABLET: 5; 325 TABLET ORAL at 04:02

## 2018-02-20 RX ADMIN — LIDOCAINE HYDROCHLORIDE 100 MG: 20 INJECTION, SOLUTION INTRAVENOUS at 10:02

## 2018-02-20 RX ADMIN — OXYCODONE HYDROCHLORIDE 5 MG: 5 TABLET ORAL at 12:02

## 2018-02-20 RX ADMIN — KETOROLAC TROMETHAMINE 30 MG: 30 INJECTION, SOLUTION INTRAMUSCULAR; INTRAVENOUS at 11:02

## 2018-02-20 RX ADMIN — ACETAMINOPHEN 1000 MG: 10 INJECTION, SOLUTION INTRAVENOUS at 10:02

## 2018-02-20 RX ADMIN — MIDAZOLAM HYDROCHLORIDE 4 MG: 5 INJECTION, SOLUTION INTRAMUSCULAR; INTRAVENOUS at 09:02

## 2018-02-20 RX ADMIN — ONDANSETRON 4 MG: 2 INJECTION, SOLUTION INTRAMUSCULAR; INTRAVENOUS at 11:02

## 2018-02-20 RX ADMIN — SODIUM CHLORIDE, SODIUM LACTATE, POTASSIUM CHLORIDE, AND CALCIUM CHLORIDE: 600; 310; 30; 20 INJECTION, SOLUTION INTRAVENOUS at 11:02

## 2018-02-20 RX ADMIN — NEOSTIGMINE METHYLSULFATE 4 MG: 1 INJECTION INTRAVENOUS at 11:02

## 2018-02-20 RX ADMIN — HYDROMORPHONE HYDROCHLORIDE 0.5 MG: 2 INJECTION INTRAMUSCULAR; INTRAVENOUS; SUBCUTANEOUS at 12:02

## 2018-02-20 RX ADMIN — SODIUM CHLORIDE, SODIUM LACTATE, POTASSIUM CHLORIDE, AND CALCIUM CHLORIDE: 600; 310; 30; 20 INJECTION, SOLUTION INTRAVENOUS at 09:02

## 2018-02-20 RX ADMIN — MIDAZOLAM HYDROCHLORIDE 2 MG: 1 INJECTION, SOLUTION INTRAMUSCULAR; INTRAVENOUS at 10:02

## 2018-02-20 RX ADMIN — GLYCOPYRROLATE 0.6 MG: 0.2 INJECTION, SOLUTION INTRAMUSCULAR; INTRAVENOUS at 11:02

## 2018-02-20 RX ADMIN — DEXAMETHASONE SODIUM PHOSPHATE 8 MG: 4 INJECTION, SOLUTION INTRAMUSCULAR; INTRAVENOUS at 10:02

## 2018-02-20 RX ADMIN — FAMOTIDINE 20 MG: 20 TABLET, FILM COATED ORAL at 09:02

## 2018-02-20 RX ADMIN — PROPOFOL 200 MG: 10 INJECTION, EMULSION INTRAVENOUS at 10:02

## 2018-02-20 RX ADMIN — ROCURONIUM BROMIDE 40 MG: 10 INJECTION, SOLUTION INTRAVENOUS at 10:02

## 2018-02-20 RX ADMIN — FENTANYL CITRATE 100 MCG: 50 INJECTION, SOLUTION INTRAMUSCULAR; INTRAVENOUS at 10:02

## 2018-02-20 NOTE — OR NURSING
Reviewed [facility]  robotic laparoscopic hysterectomy discharge instructions, prior to surgery, with verbalized understanding per patient and family.  In addition, reviewed with the patient symptoms to report after your surgery: Fever > 101. F, painful urination, increase in vaginal bleeding, persistent nausea/vomiting and worsening pain.

## 2018-02-20 NOTE — OP NOTE
OPERATIVE REPORT    DATE: (date: 2/20/2018)    PREOPERATIVE DIAGNOSIS  1. Menorrhagia with regular cycle [N92.0]    POSTOPERATIVE DIAGNOSIS  1. Menorrhagia with regular cycle [N92.0]    PROCEDURE:  1. Total Laparoscopic Hysterectomy/Bilateral Salpingectomy  2. Cystoscopy    SURGEON: Dr. Sarah Cross    ASSISTANT: Sariah Maurer pgy-3    ANESTHESIA: General    COMPLICATIONS: None    EBL: 200cc    IV FLUIDS: 1500 cc    URINE OUTPUT: 125 cc    FINDINGS: Normal size uterus approximately 10 week size, normal tubes and ovaries bilaterally    PROCEDURE: Patient was taking to the operating room where general anesthesia was administered and found to be adequate.  She was prepped and draped in the dorsal lithotomy position.  A weighted sterile speculum was placed in the vagina.  The anterior lip of the cervix was grasped with a single tooth tenaculum.  The uterus was sounded to approximately 10 cm.  A stay suture of 0-Vicryl was placed at 12 o'clock and 6 o'clock on the cervix.  A DESMOND manipulator was placed inside the endometrial cavity.      Gloves were changed.  A Veress needle was inserted into the umbilicus under tenting of the anterior abdominal wall.  Placement into the peritoneal cavity was confirmed via saline drop test.  The abdomen was insufflated to 15mm Hg using Carbon dioxide.  A 10 mm supraumbilical skin incision was made with the scalpel.  A 10 mm Optiview trocar was advanced through this incision.  Excellent hemostasis was noted.  The patient was placed in deep Trendelenburg.  An 5 mm left lateral skin incision was made with a scalpel and a 5 mm trocar was advanced through this incision under visualization of the camera.  A 5 mm right lateral skin incision was made with the scalpel.  A 5 mm trocar was advanced through this incision under visualization of the camera.  Excellent hemostasis was noted.      Attention was turned to the left round ligament.  This was cauterized and transected and continued  anteriorly to create the bladder flap to the midline.  Attention was turned to the left fallopian tube which was elevated by the fimbrae and ligated beneath by the ligasure device, along the length of the fallopian tube through the mesosalpinx, and then carried down towards the utero-ovarian ligament.  This was cauterized and transected and carried down to the level of the uterine vessels.  The vessels were cauterized but not transected.  Attention was turned to the right round ligament.  It was cauterized and transected and continued anteriorly to finish creating the bladder flap.  Attention was turned to the right fallopian tube which was elevated by the fimbrae and ligated beneath by the ligasure device, along the length of the fallopian tube through the mesosalpinx, and then carried down towards the utero-ovarian ligament.  This was cauterized and transected and carried down to the level of the uterine vessels.  The vessels were cauterized but not transected.  Attention was turned to the anterior portion of the cervix.  The bladder was dissected off the cervix.  The anterior colpotomy was created.  This was continued to the level of the uterine vessels bilaterally.  Attention was turned to the posterior portion of the uterus.  The posterior colpotomy was created and carried around to the level of the uterine vessels bilaterally.  The left uterine vessels were again cauterized and transected.  The anterior and posterior colpotomies were connected.  The right uterine vessels were cauterized and transected.  The anterior and posterior colpotomies were connected.        The uterus was removed vaginally.  A moist laparotomy sponge inside of glove was placed in the vagina to maintain intraperitoneal pressure.  The vaginal cuff was reapproximated in an interrupted fashion using 0-Vicryl suture using the Endostitch.  The pelvis was copiously irrigated and suctioned.  Excellent hemostasis was noted.      The garcia  catheter was removed.  A cystoscope was advanced through the urethra.  The bladder was inspected and found to be intact.  Both ureters were seen effluxing clear urine.  The cystoscope was removed.      Attention was turned to the anterior abdominal wall. The abdomen was deflated and all trocars were removed.  The skin was closed with 4-0 Monocryl in a subcuticular fashion. 0.25% Marcaine was injected into the incision sites.  The sponge and glove were removed.  Sponge, lap, and needle counts were correct x 2.  The patient was taken to the recovery room in stable condition.    Attending statement    I was present and scrubbed for the entire surgical procedure    Sarah Cross MD, FACOG  Obstetrics and Gynecology

## 2018-02-20 NOTE — ANESTHESIA POSTPROCEDURE EVALUATION
"Anesthesia Post Evaluation    Patient: Denisse Steen    Procedure(s) Performed: Procedure(s) (LRB):  HYSTERECTOMY-TOTAL LAPAROSCOPIC (TLH) (N/A)  CYSTOSCOPY (N/A)  SALPINGECTOMY-LAPAROSCOPIC (Bilateral)    Final Anesthesia Type: general  Patient location during evaluation: PACU  Patient participation: Yes- Able to Participate  Level of consciousness: awake and alert  Post-procedure vital signs: reviewed and stable  Pain management: adequate  Airway patency: patent  PONV status at discharge: No PONV  Anesthetic complications: no      Cardiovascular status: blood pressure returned to baseline  Respiratory status: unassisted, spontaneous ventilation and room air  Hydration status: euvolemic  Follow-up not needed.        Visit Vitals  BP (!) 96/55   Pulse (!) 49   Temp 36.4 °C (97.5 °F) (Oral)   Resp 16   Ht 5' 6" (1.676 m)   Wt 73.9 kg (163 lb)   LMP 02/02/2018 (Within Days)   SpO2 97%   Breastfeeding? No   BMI 26.31 kg/m²       Pain/Sloan Score: Pain Assessment Performed: Yes (2/20/2018  9:02 AM)  Presence of Pain: complains of pain/discomfort (2/20/2018 12:31 PM)  Pain Rating Prior to Med Admin: 6 (2/20/2018 12:35 PM)  Sloan Score: 8 (2/20/2018 12:31 PM)  Modified Sloan Score: 17 (2/20/2018 12:31 PM)      "

## 2018-02-20 NOTE — TRANSFER OF CARE
"Anesthesia Transfer of Care Note    Patient: Denisse Steen    Procedure(s) Performed: Procedure(s) (LRB):  HYSTERECTOMY-TOTAL LAPAROSCOPIC (TLH) (N/A)  CYSTOSCOPY (N/A)  SALPINGECTOMY-LAPAROSCOPIC (Bilateral)    Patient location: PACU    Anesthesia Type: general    Transport from OR: Transported from OR on 2-3 L/min O2 by NC with adequate spontaneous ventilation. Continuous SpO2 monitoring in transport    Post pain: adequate analgesia    Post assessment: no apparent anesthetic complications    Post vital signs: stable    Level of consciousness: awake    Nausea/Vomiting: no nausea/vomiting    Complications: none    Transfer of care protocol was followed      Last vitals:   Visit Vitals  /78 (BP Location: Left arm, Patient Position: Lying)   Pulse (!) 55   Temp 37 °C (98.6 °F) (Oral)   Resp 16   Ht 5' 6" (1.676 m)   Wt 73.9 kg (163 lb)   LMP 02/02/2018 (Within Days)   SpO2 100%   Breastfeeding? No   BMI 26.31 kg/m²     "

## 2018-02-20 NOTE — DISCHARGE INSTRUCTIONS
Discharge Instructions for Laparoscopic Hysterectomy  You had a procedure called laparoscopic hysterectomy. A surgeon removed your uterus using instruments inserted through small incisions in your abdomen. These incisions may be tender or sore. You may also have pain in your upper back or shoulders. This is from the gas used to enlarge your abdomen to allow your doctor to see inside your pelvis and perform the procedure. This pain usually goes away in a day or two. It usually takes from 1 to 4 weeks to recover from laparoscopic hysterectomy. Remember, though, that recovery time varies from woman to woman. Here's what you can do to speed your recovery following surgery.  Home care   · Continue the coughing and deep breathing exercises that you learned in the hospital.  · Take your medications exactly as directed by your doctor.  · Avoid constipation.  ¨ Eat fruits, vegetables, and whole grains.  ¨ Drink 6 to 8 glasses of water a day, unless told to do otherwise.  ¨ Use a laxative or a mild stool softener if your doctor says it's OK.  · Shower as usual. Wash your incisions with mild soap and water. Pat dry.  · Don't use oils, powders, or lotions on your incisions.  · Don't put anything in your vagina until your doctor says it's safe to do so. Don't use tampons or douches. Don't have sex.  · If you had both ovaries removed, report hot flashes, mood swings, and irritability to your doctor. There may be medications that can help you.  Activity  · Ask your doctor when you can start driving again. It's usually okay to drive as soon as you are free of pain and able to move comfortably from side to side. Don't drive while you are still taking opioid pain medications.  · Ask others to help with chores and errands while you recover.  · Dont lift anything heavier than 10 pounds for 6 weeks.  · Dont vacuum or do other strenuous activities until the doctor says it's OK.  · Walk as often as you feel able.  · Don't drive for a  few days after the surgery. You may drive as soon as you are able to move comfortably from side to side and when you are no longer taking narcotics.  · Climb stairs slowly and pause after every few steps.  Follow-up care  Make a follow-up appointment as directed by our staff.     When to call your doctor  Call your doctor right away if you have any of the following:  · Fever above 100.4°F (38°C) or chills  · Bright red vaginal bleeding or vaginal bleeding that soaks more than one sanitary pad per hour  · A foul smelling discharge from the vagina  · Trouble urinating or burning when you urinate  · Severe pain or bloating in your abdomen  · Redness, swelling, or drainage at your incision sites  · Shortness of breath or chest pain  · Nausea and vomiting   Date Last Reviewed: 5/19/2015  © 6532-5921 [employer]. [address] [place] [address]. All rights reserved. This information is not intended as a substitute for professional medical care. Always follow your healthcare professional's instructions.

## 2018-02-20 NOTE — DISCHARGE SUMMARY
[facility]  Brief Operative Note     SUMMARY     Surgery Date: 2/20/2018     Surgeon(s) and Role:     * Sarah Cross MD - Primary    Assistant: Sariah Maurer pgy-3    Pre-op Diagnosis:  Menorrhagia with regular cycle [N92.0]    Post-op Diagnosis:  Post-Op Diagnosis Codes:     * Menorrhagia with regular cycle [N92.0]    Procedure(s) (LRB):  HYSTERECTOMY-TOTAL LAPAROSCOPIC (TLH) (N/A)  CYSTOSCOPY (N/A)  SALPINGECTOMY-LAPAROSCOPIC (Bilateral)    Anesthesia: General    Findings/Key Components:   1. Normal appearing external female genitalia  2. Cervix normal in appearance, no masses/lesions  3. Uterus sounding to 10cm  4. Normal appearing pelvic anatomy including bilateral fallopian tubes and ovaries. Normal uterine size and contour.   5. No gross abnormalities of the appendix, liver edge, gallbladder, and lesser curve of the stomach.   6. No adhesive disease.   7. On cystoscopy, normal appearing urethra, bladder mucosa, and good bilateral efflux from utereral orifices visualized.          Estimated Blood Loss: 200 mL    Urine Output: 125ml    IVF: 1500ml         Specimens:   Specimen (12h ago through future)    Start     Ordered    02/20/18 1126  Specimen to Pathology - Surgery  Once     Comments:  1. UTERUS, CERVIX, BILATERAL FALLOPIAN TUBES      02/20/18 1126          Discharge Note    SUMMARY     Admit Date: 2/20/2018    Discharge Date and Time:  02/20/2018 12:04 PM    Hospital Course (synopsis of major diagnoses, care, treatment, and services provided during the course of the hospital stay): Patient presented for scheduled procedure. Patient was passed back to OR for TLH/BS. Please see OP note for further details. Tolerated procedure well and patient was taken to recovery in a stable condition. Prior to discharge patient was able to void, ambulate, tolerate PO and pain was well controlled with PO meds. Patient was given routine post-op instructions for which patient voiced  understanding. Patient was subsequently discharged home.       Final Diagnosis: Post-Op Diagnosis Codes:     * Menorrhagia with regular cycle [N92.0]    Disposition: Home or Self Care    Follow Up/Patient Instructions:     Medications:  Reconciled Home Medications:   Current Discharge Medication List      START taking these medications    Details   hydrocodone-acetaminophen 5-325mg (NORCO) 5-325 mg per tablet Take 1 tablet by mouth every 4 (four) hours as needed for Pain.  Qty: 40 tablet, Refills: 0      ibuprofen (ADVIL,MOTRIN) 800 MG tablet Take 1 tablet (800 mg total) by mouth every 8 (eight) hours as needed for Pain.  Qty: 30 tablet, Refills: 1             Discharge Procedure Orders  Diet general     Activity as tolerated     Lifting restrictions     Other restrictions (specify):   Order Comments: Pelvic rest for 6 weeks     Call MD for:  temperature >100.4     Call MD for:  persistent nausea and vomiting     Call MD for:  severe uncontrolled pain     Call MD for:  difficulty breathing, headache or visual disturbances     Call MD for:  redness, tenderness, or signs of infection (pain, swelling, redness, odor or green/yellow discharge around incision site)     Call MD for:   Order Comments: Heavy vaginal bleeding soaking more than 1 pad per hour for 2 hours       Follow-up Information     Sarah Cross MD. Schedule an appointment as soon as possible for a visit in 6 weeks.    Specialties:  Obstetrics, Obstetrics and Gynecology  Why:  post op appointment  Contact information:  [address] [id number] [phone]

## 2018-02-20 NOTE — INTERVAL H&P NOTE
The patient has been examined and the H&P has been reviewed:    I concur with the findings and no changes have occurred since H&P was written.    Surgery risks, benefits and alternative options discussed and understood by patient/family.          Active Hospital Problems    Diagnosis  POA    Menorrhagia [N92.0]  Yes      Resolved Hospital Problems    Diagnosis Date Resolved POA   No resolved problems to display.

## 2018-02-21 ENCOUNTER — TELEPHONE (OUTPATIENT)
Dept: OBSTETRICS AND GYNECOLOGY | Facility: CLINIC | Age: 43
End: 2018-02-21

## 2018-02-21 NOTE — TELEPHONE ENCOUNTER
Contacted the pt to schedule post-op appointment. Pt stated that she wanted a 5 week post-op appointment. Offered the pt 3/28 at 1:15PM. Pt agreed to the appointment and informed that a letter reminder will be sent out. Pt verbalized understanding. Letter sent out.

## 2018-02-21 NOTE — PLAN OF CARE
Denisse Steen has met all discharge criteria from Phase II. Vital Signs are stable, ambulating  without difficulty. Discharge instructions given earlier per DE Mednoza, patient verbalized understanding. Discharged from facility via wheelchair in stable condition.

## 2018-03-05 ENCOUNTER — TELEPHONE (OUTPATIENT)
Dept: OBSTETRICS AND GYNECOLOGY | Facility: CLINIC | Age: 43
End: 2018-03-05

## 2018-03-05 NOTE — LETTER
March 6, 2018    Denisse Steen  [address]  [place] [address]         [facility] [address]  [address] [facility] [address]  [place] [address]  Phone: [phone]  Fax: [phone] March 6, 2018     Patient: Denisse Steen   YOB: 1975   Date of Visit: 3/5/2018       To Whom It May Concern:    It is my medical opinion that Denisse Steen will not be medically cleared to return to work until after March 29, 2018.    If you have any questions or concerns, please don't hesitate to call.    Sincerely,        Sarah Cross MD

## 2018-03-05 NOTE — TELEPHONE ENCOUNTER
Returned the pt's call to the clinic. Pt requesting a letter that states that she can not be cleared to return to work until her post-op appointment on 3/29. Informed the pt that her request will be forwarded to Dr. Cross and that she will be contacted once Dr. Cross responds. Pt verbalized understanding.      Can you write this pt a letter for her employer stating that she can not return to work until she is cleared at her post op appointment?

## 2018-03-05 NOTE — TELEPHONE ENCOUNTER
----- Message from Jimmie Lopez sent at 3/5/2018  9:41 AM CST -----  Contact: patient  x_ 1st Request   _ 2nd Request   _ 3rd Request     Who: CHATA GONZALEZ [[id number]]    Why: patient is requesting a call back in reference to discussing another return to work letter for her.  Please call the patient.    What Number to Call Back: [phone]    When to Expect a call back: (Before the end of the day)   -- if call after 3:00 call back will be tomorrow.

## 2018-03-15 ENCOUNTER — TELEPHONE (OUTPATIENT)
Dept: OBSTETRICS AND GYNECOLOGY | Facility: CLINIC | Age: 43
End: 2018-03-15

## 2018-03-15 NOTE — TELEPHONE ENCOUNTER
Returned the pt's call to the clinic. Pt inquired if she can move her post-op appointment up sooner. Informed the pt that Dr. Cross doesn't have any sooner appointments available. Pt verbalized understanding and stated that she will keep her scheduled post op appointment.

## 2018-03-15 NOTE — TELEPHONE ENCOUNTER
----- Message from Aziza Zelaya sent at 3/15/2018  8:46 AM CDT -----  Contact: self  Pt needing her post op 1 week before, she can be reached at [phone].

## 2018-03-29 ENCOUNTER — OFFICE VISIT (OUTPATIENT)
Dept: OBSTETRICS AND GYNECOLOGY | Facility: CLINIC | Age: 43
End: 2018-03-29
Attending: OBSTETRICS & GYNECOLOGY
Payer: COMMERCIAL

## 2018-03-29 VITALS
WEIGHT: 167.13 LBS | BODY MASS INDEX: 26.86 KG/M2 | SYSTOLIC BLOOD PRESSURE: 116 MMHG | HEIGHT: 66 IN | DIASTOLIC BLOOD PRESSURE: 80 MMHG

## 2018-03-29 DIAGNOSIS — Z09 POSTOP CHECK: Primary | ICD-10-CM

## 2018-03-29 PROCEDURE — 99999 PR PBB SHADOW E&M-EST. PATIENT-LVL II: CPT | Mod: PBBFAC,,, | Performed by: OBSTETRICS & GYNECOLOGY

## 2018-03-29 PROCEDURE — 99024 POSTOP FOLLOW-UP VISIT: CPT | Mod: S$GLB,,, | Performed by: OBSTETRICS & GYNECOLOGY

## 2018-03-29 NOTE — LETTER
March 29, 2018    Denisse Steen  [address]  [place] [address]         [facility] [address]  [address] [facility] [address]  [place] [address]  Phone: [phone]  Fax: [phone] March 29, 2018     Patient: Denisse Steen   YOB: 1975   Date of Visit: 3/29/2018       To Whom It May Concern:    It is my medical opinion that Denisse Steen may return to work on April 4, 2018.    If you have any questions or concerns, please don't hesitate to call.    Sincerely,        Sarah Cross MD

## 2018-03-29 NOTE — PROGRESS NOTES
"CC: Postoperative visit    Denisse Steen is a 42 y.o. female  presents for a postoperative visit s/p TLH, BS, Cystoscopy on 2018.  Her postoperative course was uncomplicated.  She is doing well postoperative.    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    /80   Ht 5' 6" (1.676 m)   Wt 75.8 kg (167 lb 1.7 oz)   LMP 2018 (Approximate)   BMI 26.97 kg/m²     ROS:  GENERAL: No fever, chills, fatigability or weight loss.  VULVAR: No pain, no lesions and no itching.  VAGINAL: No relaxation, no itching, no discharge, no abnormal bleeding and no lesions.  ABDOMEN: No abdominal pain. Denies nausea. Denies vomiting. No diarrhea. No constipation  BREAST: Denies pain. No lumps. No discharge.  URINARY: No incontinence, no nocturia, no frequency and no dysuria.  CARDIOVASCULAR: No chest pain. No shortness of breath. No leg cramps.  NEUROLOGICAL: No headaches. No vision changes.    Physical Exam    INCISION: Clean, dry, intact. Well healed.    PELVIC: Normal external genitalia without lesions.  Normal hair distribution.  Adequate perineal body, normal urethral meatus.  Vagina moist and well rugated without lesions or discharge.  Vaginal cuff intact.  Bimanual exam shows uterus to be surgically absent.      1. Postop check         Patient can return to normal activities.  Return to clinic in 1 year for well woman exam.    "

## 2018-07-31 ENCOUNTER — HOSPITAL ENCOUNTER (OUTPATIENT)
Dept: RADIOLOGY | Facility: HOSPITAL | Age: 43
Discharge: HOME | End: 2018-07-31
Attending: FAMILY MEDICINE
Payer: COMMERCIAL

## 2018-07-31 ENCOUNTER — OFFICE VISIT (OUTPATIENT)
Dept: INTERNAL MEDICINE | Facility: CLINIC | Age: 43
End: 2018-07-31
Payer: COMMERCIAL

## 2018-07-31 VITALS
OXYGEN SATURATION: 100 % | BODY MASS INDEX: 26.79 KG/M2 | HEART RATE: 60 BPM | WEIGHT: 166.69 LBS | DIASTOLIC BLOOD PRESSURE: 88 MMHG | HEIGHT: 66 IN | SYSTOLIC BLOOD PRESSURE: 124 MMHG

## 2018-07-31 DIAGNOSIS — R07.1 CHEST PAIN ON BREATHING: ICD-10-CM

## 2018-07-31 DIAGNOSIS — Z00.00 PREVENTATIVE HEALTH CARE: ICD-10-CM

## 2018-07-31 DIAGNOSIS — R68.81 EARLY SATIETY: Primary | ICD-10-CM

## 2018-07-31 PROCEDURE — 71046 X-RAY EXAM CHEST 2 VIEWS: CPT | Mod: 26,,, | Performed by: RADIOLOGY

## 2018-07-31 PROCEDURE — 99396 PREV VISIT EST AGE 40-64: CPT | Mod: S$GLB,,, | Performed by: FAMILY MEDICINE

## 2018-07-31 PROCEDURE — 71046 X-RAY EXAM CHEST 2 VIEWS: CPT | Mod: TC

## 2018-07-31 PROCEDURE — 99999 PR PBB SHADOW E&M-EST. PATIENT-LVL IV: CPT | Mod: PBBFAC,,, | Performed by: FAMILY MEDICINE

## 2018-07-31 RX ORDER — GLYCOPYRROLATE 2 MG/1
1 TABLET ORAL 3 TIMES DAILY
COMMUNITY
End: 2019-10-11

## 2018-07-31 NOTE — PROGRESS NOTES
Subjective:       Patient ID: Denisse Steen is a 43 y.o. female.    Chief Complaint: Annual Exam  Denisse Steen 43 y.o. female is here for office visit to review care and physical exam, here for usual care.  ROS unremarkable except has chest pain upon deep breathing, noted for a year or so and early satiety.  Note had Hyst 2nd to fibroids.  Has gym in house, good diet and exercise reported      HPI  Review of Systems   Constitutional: Negative for activity change, fatigue, fever and unexpected weight change.   HENT: Negative for congestion, hearing loss, postnasal drip and rhinorrhea.    Eyes: Negative for redness and visual disturbance.   Respiratory: Negative for chest tightness, shortness of breath and wheezing.    Cardiovascular: Negative for chest pain, palpitations and leg swelling.   Gastrointestinal: Negative for abdominal distention.   Genitourinary: Negative for decreased urine volume, dysuria, flank pain, hematuria, pelvic pain and urgency.   Musculoskeletal: Negative for back pain, gait problem, joint swelling and neck stiffness.   Skin: Negative for color change, rash and wound.   Neurological: Negative for dizziness, syncope, weakness and headaches.   Psychiatric/Behavioral: Negative for behavioral problems, confusion and sleep disturbance. The patient is not nervous/anxious.        Objective:      Physical Exam   Constitutional: She is oriented to person, place, and time. She appears well-developed and well-nourished. No distress.   HENT:   Head: Normocephalic.   Mouth/Throat: No oropharyngeal exudate.   Eyes: EOM are normal. Pupils are equal, round, and reactive to light. No scleral icterus.   Neck: Neck supple. No JVD present. No thyromegaly present.   Cardiovascular: Normal rate, regular rhythm and normal heart sounds.  Exam reveals no gallop and no friction rub.    No murmur heard.  Pulmonary/Chest: Effort normal and breath sounds normal. She has no wheezes. She has no rales.    Abdominal: Soft. Bowel sounds are normal. She exhibits no distension and no mass. There is no tenderness. There is no guarding.   Musculoskeletal: Normal range of motion. She exhibits no edema.   Lymphadenopathy:     She has no cervical adenopathy.   Neurological: She is alert and oriented to person, place, and time. She has normal reflexes. She displays normal reflexes. No cranial nerve deficit. She exhibits normal muscle tone.   Skin: Skin is warm. No rash noted. No erythema.   Psychiatric: She has a normal mood and affect. Thought content normal.       Assessment:       1. Early satiety    2. Preventative health care    3. Chest pain on breathing        Plan:       Denisse was seen today for annual exam.    Diagnoses and all orders for this visit:    Early satiety  -     Ambulatory Referral to Gastroenterology    Preventative health care  -     Comprehensive metabolic panel; Future  -     Lipid panel; Future  -     CBC auto differential; Future  -     Hemoglobin A1c; Future  -     TSH; Future    Chest pain on breathing  -     Cardiac treadmill stress test; Future  -     X-Ray Chest PA And Lateral; Future  -     Ambulatory Referral to Gastroenterology  -     Ambulatory Referral to Pulmonology

## 2018-08-06 ENCOUNTER — PATIENT MESSAGE (OUTPATIENT)
Dept: INTERNAL MEDICINE | Facility: CLINIC | Age: 43
End: 2018-08-06

## 2018-08-06 ENCOUNTER — TELEPHONE (OUTPATIENT)
Dept: INTERNAL MEDICINE | Facility: CLINIC | Age: 43
End: 2018-08-06

## 2018-08-06 DIAGNOSIS — R35.0 FREQUENCY OF URINATION: Primary | ICD-10-CM

## 2018-08-06 NOTE — TELEPHONE ENCOUNTER
----- Message from Daniela Lopez sent at 8/6/2018 11:08 AM CDT -----  Contact: self/ [phone]  Patient have a medical question that she would like to discuss with the doctor.

## 2018-08-14 ENCOUNTER — OFFICE VISIT (OUTPATIENT)
Dept: UROLOGY | Facility: CLINIC | Age: 43
End: 2018-08-14
Payer: COMMERCIAL

## 2018-08-14 VITALS
HEART RATE: 78 BPM | DIASTOLIC BLOOD PRESSURE: 85 MMHG | HEIGHT: 66 IN | WEIGHT: 166 LBS | SYSTOLIC BLOOD PRESSURE: 119 MMHG | BODY MASS INDEX: 26.68 KG/M2

## 2018-08-14 DIAGNOSIS — R35.0 URINARY FREQUENCY: ICD-10-CM

## 2018-08-14 PROCEDURE — 87086 URINE CULTURE/COLONY COUNT: CPT

## 2018-08-14 PROCEDURE — 99243 OFF/OP CNSLTJ NEW/EST LOW 30: CPT | Mod: 25,S$GLB,, | Performed by: UROLOGY

## 2018-08-14 PROCEDURE — 99999 PR PBB SHADOW E&M-EST. PATIENT-LVL III: CPT | Mod: PBBFAC,,, | Performed by: UROLOGY

## 2018-08-14 PROCEDURE — 51701 INSERT BLADDER CATHETER: CPT | Mod: S$GLB,,, | Performed by: UROLOGY

## 2018-08-14 NOTE — LETTER
August 14, 2018      Dontae Horta MD  [address]  [facility] [address]           [facility] 4th Floor  [address]  [facility] [address]  Phone: [phone]          Patient: Denisse Steen   MR Number: [id number]   YOB: 1975   Date of Visit: 8/14/2018       Dear Dr. Dontae Horta:    Thank you for referring Denisse Steen to me for evaluation. Attached you will find relevant portions of my assessment and plan of care.    If you have questions, please do not hesitate to call me. I look forward to following Denisse Steen along with you.    Sincerely,    Leti Perez MD    Enclosure  CC:  No Recipients    If you would like to receive this communication electronically, please contact [email or web address] or [phone] to request more information on [email or web address] Link access.    For providers and/or their staff who would like to refer a patient to [facility], please contact us through our one-stop-shop provider referral line, [facility], at [phone].    If you feel you have received this communication in error or would no longer like to receive these types of communications, please e-mail [email or web address]

## 2018-08-14 NOTE — PROGRESS NOTES
Subjective:       Patient ID: Denisse Steen is a 43 y.o. female.    Chief Complaint: Urinary Frequency (states she has had frequency for over a year now, daytime only every one hour. she empties well, she double voids to be sure she empties)    Denisse Steen is a 43 y.o. Female referred from Dontae Horta MD for urinary frequency.   Always present since she was a teenager, worsening.   Every 1 hour. Feeling of incomplete voiding. She can hold it.   She has tried medication.    She has had flank pain x 1 year.     No dysuria, hematuria, incontinence. No recurrent UTI.   No nocturia at all.     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     No constipation.     History of anemia.     She doesn't drink coffee, sodas.   She has stopped energy drinks.   She is drinking water mostly. 5 bottles of water a day.     Hysterectomy 2018. No changes in urinary symptoms.      vaginal delivery.         Past Surgical History:   Procedure Laterality Date    ADENOIDECTOMY      CERVICAL BIOPSY  W/ LOOP ELECTRODE EXCISION      DILATION AND CURETTAGE OF UTERUS      HYSTERECTOMY  2018    OVARIAN CYST REMOVAL      TONSILLECTOMY      WISDOM TOOTH EXTRACTION         Past Medical History:   Diagnosis Date    Abnormal Pap smear     Anemia     Anxiety 2013    Blood transfusion        Social History     Socioeconomic History    Marital status:      Spouse name: Ty    Number of children: 1    Years of education: Not on file    Highest education level: Not on file   Social Needs    Financial resource strain: Not on file    Food insecurity - worry: Not on file    Food insecurity - inability: Not on file    Transportation needs - medical: Not on file    Transportation needs - non-medical: Not on file   Occupational History    Occupation: [employer]     Employer: [employer]   Tobacco Use    Smoking status: Never Smoker    Smokeless  tobacco: Never Used   Substance and Sexual Activity    Alcohol use: Yes     Comment: socially    Drug use: No    Sexual activity: Yes     Partners: Male     Birth control/protection: None   Other Topics Concern    Not on file   Social History Narrative    Not on file       Family History   Problem Relation Age of Onset    Diabetes Mother     Cancer Father     Cancer Sister         Cervical     Ovarian cancer Neg Hx     Colon cancer Neg Hx     Breast cancer Neg Hx        Current Outpatient Medications   Medication Sig Dispense Refill    glycopyrrolate (ROBINUL) 2 MG Tab Take 1 mg by mouth 3 (three) times daily.       No current facility-administered medications for this visit.        Review of patient's allergies indicates:  No Known Allergies     BMP  Lab Results   Component Value Date     07/31/2018    K 3.6 07/31/2018     07/31/2018    CO2 24 07/31/2018    BUN 10 07/31/2018    CREATININE 1.0 07/31/2018    CALCIUM 9.1 07/31/2018    ANIONGAP 5 (L) 07/31/2018    ESTGFRAFRICA >60 07/31/2018    EGFRNONAA >60 07/31/2018       Review of Systems   Constitutional: Negative for chills, fever and unexpected weight change.   HENT: Negative for nosebleeds.    Eyes: Negative for visual disturbance.   Respiratory: Negative for chest tightness.    Cardiovascular: Negative for chest pain.   Gastrointestinal: Negative for constipation and diarrhea.   Genitourinary: Positive for frequency and urgency. Negative for dysuria, hematuria and nocturia.   Musculoskeletal: Negative for myalgias.   Skin: Negative for rash.   Neurological: Negative for seizures.   Hematological: Does not bruise/bleed easily.   Psychiatric/Behavioral: Negative for behavioral problems.     Objective:      Physical Exam   Vitals reviewed.  Constitutional: She is oriented to person, place, and time. She appears well-developed and well-nourished.   HENT:   Head: Normocephalic and atraumatic.   Eyes: No scleral icterus.   Cardiovascular:  Normal rate and regular rhythm.    Pulmonary/Chest: Effort normal. No respiratory distress.   Abdominal: She exhibits no mass.   Genitourinary:   Genitourinary Comments: The external genitalia were normal. No rash. Atrophic vaginitis was not present. The urethral showed no evidence of a urethral diverticulum.  And in and out cath was performed under sterile conditions immediately after voiding. The PVR was 120 ml.    Perineum normal. External hemorrhoids were not present. Levator was not tender to palpation.   The uterus was not present. A cystocele was not present. A rectocele was not present.      Musculoskeletal: She exhibits no tenderness.   Lymphadenopathy:     She has no cervical adenopathy.   Neurological: She is alert and oriented to person, place, and time.   Skin: Skin is warm and dry. No rash noted.     Psychiatric: She has a normal mood and affect.     urine dip clear  Assessment:       1. Urinary frequency        Plan:   Denisse was seen today for urinary frequency.    Diagnoses and all orders for this visit:    Urinary frequency  -     Urine culture    Other orders  -     mirabegron (MYRBETRIQ) 25 mg Tb24 ER tablet; Take 1 tablet (25 mg total) by mouth once daily.    Will not try an anticholinergic as she is already on robinul.   Patient to check in with me in 1 month and let me know how she is doing.     I would like to thank Dontae Horta MD for referral of this patient.

## 2018-08-14 NOTE — PATIENT INSTRUCTIONS
Mirabegron extended-release tablets  What is this medicine?  MIRABEGRON (JOSIAH a BEG miranda) is used to treat overactive bladder. This medicine reduces the amount of bathroom visits. It may also help to control wetting accidents.  How should I use this medicine?  Take this medicine by mouth with a glass of water. Follow the directions on the prescription label. Do not cut, crush or chew this medicine. You can take it with or without food. If it upsets your stomach, take it with food. Take your medicine at regular intervals. Do not take it more often than directed. Do not stop taking except on your doctor's advice.  Talk to your pediatrician regarding the use of this medicine in children. Special care may be needed.  What side effects may I notice from receiving this medicine?  Side effects that you should report to your doctor or health care professional as soon as possible:  · allergic reactions like skin rash, itching or hives, swelling of the face, lips, or tongue  · chest pain or palpitations  · severe or sudden headache  · high blood pressure  · fast, irregular heartbeat  · redness, blistering, peeling or loosening of the skin, including inside the mouth  · signs of infection like fever or chills; cough; sore throat; pain or difficulty passing urine  · trouble passing urine or change in the amount of urine  Side effects that usually do not require medical attention (Report these to your doctor or health care professional if they continue or are bothersome.):  · constipation  · diarrhea  · dizziness  · dry eyes  · joint pain  · mild headache  · nausea  · runny nose  What may interact with this medicine?  · certain medicines for bladder problems like fesoterodine, oxybutynin, solifenacin, tolterodine  · desipramine  · digoxin  · flecainide  · ketoconazole  · MAOIs like Carbex, Eldepryl, Marplan, Nardil, and Parnate  · metoprolol  · propafenone  · thioridazine  · warfarin  What if I miss a dose?  If you miss a dose,  take it as soon as you can. If it is almost time for your next dose, take only that dose. Do not take double or extra doses.  Where should I keep my medicine?  Keep out of the reach of children.  Store at room temperature between 15 and 30 degrees C (59 and 86 degrees F). Throw away any unused medicine after the expiration date.  What should I tell my health care provider before I take this medicine?  They need to know if you have any of these conditions:  · difficulty passing urine  · high blood pressure  · kidney disease  · liver disease  · an unusual or allergic reaction to mirabegron, other medicines, foods, dyes, or preservatives  · pregnant or trying to get pregnant  · breast-feeding  What should I watch for while using this medicine?  It may take 8 weeks to notice the full benefit from this medicine.  You may need to limit your intake tea, coffee, caffeinated sodas, and alcohol. These drinks may make your symptoms worse.  Visit your doctor or health care professional for regular checks on your progress. Check your blood pressure as directed. Ask your doctor or health care professional what your blood pressure should be and when you should contact him or her.  NOTE:This sheet is a summary. It may not cover all possible information. If you have questions about this medicine, talk to your doctor, pharmacist, or health care provider. Copyright© 2017 [employer]

## 2018-08-15 ENCOUNTER — PATIENT MESSAGE (OUTPATIENT)
Dept: UROLOGY | Facility: CLINIC | Age: 43
End: 2018-08-15

## 2018-08-16 ENCOUNTER — PATIENT MESSAGE (OUTPATIENT)
Dept: UROLOGY | Facility: CLINIC | Age: 43
End: 2018-08-16

## 2018-08-16 LAB — BACTERIA UR CULT: NO GROWTH

## 2018-08-20 RX ORDER — OXYBUTYNIN CHLORIDE 10 MG/1
10 TABLET, EXTENDED RELEASE ORAL DAILY
Qty: 30 TABLET | Refills: 12 | Status: SHIPPED | OUTPATIENT
Start: 2018-08-20 | End: 2019-04-29

## 2018-10-05 ENCOUNTER — TELEPHONE (OUTPATIENT)
Dept: OBSTETRICS AND GYNECOLOGY | Facility: CLINIC | Age: 43
End: 2018-10-05

## 2018-10-05 DIAGNOSIS — Z12.31 ENCOUNTER FOR SCREENING MAMMOGRAM FOR BREAST CANCER: Primary | ICD-10-CM

## 2018-10-05 NOTE — TELEPHONE ENCOUNTER
Returned the pt's call to the clinic. Pt requesting mmg orders for routine screening. Patient agreed to an appointment on Tuesday, 10/09 at 2:15PM. Pt verbalized understanding. Letter sent out.

## 2018-10-05 NOTE — TELEPHONE ENCOUNTER
----- Message from Liliam Sosa sent at 10/5/2018  2:09 PM CDT -----  Contact: Pt   Name of Who is Calling: CHATA GONZALEZ [[id number]]    What is the request in detail: Pt called to schedule her mammo, please place order.     Can the clinic reply by [facility]: Yes     What Number to Call Back if not in [facility]:

## 2018-10-16 ENCOUNTER — HOSPITAL ENCOUNTER (OUTPATIENT)
Dept: RADIOLOGY | Facility: OTHER | Age: 43
Discharge: HOME | End: 2018-10-16
Attending: OBSTETRICS & GYNECOLOGY
Payer: COMMERCIAL

## 2018-10-16 DIAGNOSIS — Z12.31 ENCOUNTER FOR SCREENING MAMMOGRAM FOR BREAST CANCER: ICD-10-CM

## 2018-10-16 PROCEDURE — 77063 BREAST TOMOSYNTHESIS BI: CPT | Mod: 26,,, | Performed by: RADIOLOGY

## 2018-10-16 PROCEDURE — 77067 SCR MAMMO BI INCL CAD: CPT | Mod: 26,,, | Performed by: RADIOLOGY

## 2018-10-16 PROCEDURE — 77067 SCR MAMMO BI INCL CAD: CPT | Mod: TC

## 2018-10-16 PROCEDURE — 77063 BREAST TOMOSYNTHESIS BI: CPT | Mod: TC

## 2018-11-05 PROBLEM — Z00.00 PREVENTATIVE HEALTH CARE: Status: RESOLVED | Noted: 2018-07-31 | Resolved: 2018-11-05

## 2019-02-06 ENCOUNTER — OFFICE VISIT (OUTPATIENT)
Dept: NEUROLOGY | Facility: CLINIC | Age: 44
End: 2019-02-06
Payer: COMMERCIAL

## 2019-02-06 VITALS
HEIGHT: 66 IN | DIASTOLIC BLOOD PRESSURE: 84 MMHG | BODY MASS INDEX: 26.14 KG/M2 | HEART RATE: 67 BPM | SYSTOLIC BLOOD PRESSURE: 124 MMHG | WEIGHT: 162.69 LBS

## 2019-02-06 DIAGNOSIS — R41.3 MEMORY DIFFICULTIES: ICD-10-CM

## 2019-02-06 DIAGNOSIS — G43.109 MIGRAINE WITH AURA AND WITHOUT STATUS MIGRAINOSUS, NOT INTRACTABLE: Primary | ICD-10-CM

## 2019-02-06 PROCEDURE — 99204 OFFICE O/P NEW MOD 45 MIN: CPT | Mod: S$GLB,,, | Performed by: PSYCHIATRY & NEUROLOGY

## 2019-02-06 PROCEDURE — 3008F PR BODY MASS INDEX (BMI) DOCUMENTED: ICD-10-PCS | Mod: CPTII,S$GLB,, | Performed by: PSYCHIATRY & NEUROLOGY

## 2019-02-06 PROCEDURE — 3008F BODY MASS INDEX DOCD: CPT | Mod: CPTII,S$GLB,, | Performed by: PSYCHIATRY & NEUROLOGY

## 2019-02-06 PROCEDURE — 99204 PR OFFICE/OUTPT VISIT, NEW, LEVL IV, 45-59 MIN: ICD-10-PCS | Mod: S$GLB,,, | Performed by: PSYCHIATRY & NEUROLOGY

## 2019-02-06 PROCEDURE — 99999 PR PBB SHADOW E&M-EST. PATIENT-LVL III: CPT | Mod: PBBFAC,,, | Performed by: STUDENT IN AN ORGANIZED HEALTH CARE EDUCATION/TRAINING PROGRAM

## 2019-02-06 PROCEDURE — 99999 PR PBB SHADOW E&M-EST. PATIENT-LVL III: ICD-10-PCS | Mod: PBBFAC,,, | Performed by: STUDENT IN AN ORGANIZED HEALTH CARE EDUCATION/TRAINING PROGRAM

## 2019-02-06 RX ORDER — AMITRIPTYLINE HYDROCHLORIDE 10 MG/1
10 TABLET, FILM COATED ORAL NIGHTLY
Qty: 90 TABLET | Refills: 3 | Status: SHIPPED | OUTPATIENT
Start: 2019-02-06 | End: 2019-10-15

## 2019-02-08 PROBLEM — G43.109 MIGRAINE WITH AURA AND WITHOUT STATUS MIGRAINOSUS, NOT INTRACTABLE: Status: ACTIVE | Noted: 2019-02-08

## 2019-02-08 PROBLEM — R41.3 MEMORY DIFFICULTIES: Status: ACTIVE | Noted: 2019-02-08

## 2019-02-09 NOTE — PROGRESS NOTES
Patient Name: Denisse Steen  MRN: [id number]    CC: Headaches    HPI: Denisse Steen is a 43 y.o. female with PMH of migraines who presents with headaches and memory loss.     Started having headaches since 2004. Typical attack is characterized by bifrontal throbbing headache that lasts from a few hours to whole day, preceeded by spots in her visual fields. Associated with photophobia and phonophobia along with emesis when the headaches are particularly severe. For the past few months, she has attacks 1-2/week. Her triggers include loud sounds and stress. She works as a  and her shifts are from 7pm-3am and as a result, her sleep patten is erratic. She usually resorts to trigger avoidance along with Exedrin Migraine or goodies powder when her headaches are unbearable. Says that she does not take OTC pain meds >1/week.  She also reports memory loss for the past year. Would forget names of people she has known for a long time or would sometimes have trouble with arithmetic calculations. Otherwise no other complaints regarding her memory; no problem with ADLs    She was first diagnosed with migraines at the age of 10. At age 19, she stopped having migraines. During that time, she did not take any scheduled medications and would resort to trigger avoidance. When her headaches were severe, she would go to the nearest ED and get 'shots'.          Past Medical History  Past Medical History:   Diagnosis Date    Abnormal Pap smear     Anemia     Anxiety 7/18/2013    Blood transfusion 2008       Medications    Current Outpatient Medications:     glycopyrrolate (ROBINUL) 2 MG Tab, Take 1 mg by mouth 3 (three) times daily., Disp: , Rfl:     amitriptyline (ELAVIL) 10 MG tablet, Take 1 tablet (10 mg total) by mouth every evening., Disp: 90 tablet, Rfl: 3    oxybutynin (DITROPAN-XL) 10 MG 24 hr tablet, Take 1 tablet (10 mg total) by mouth once daily., Disp: 30 tablet, Rfl:  "12    Allergies  Review of patient's allergies indicates:  No Known Allergies    Social History  Social History     Socioeconomic History    Marital status:      Spouse name: Ty    Number of children: 1    Years of education: Not on file    Highest education level: Not on file   Social Needs    Financial resource strain: Not on file    Food insecurity - worry: Not on file    Food insecurity - inability: Not on file    Transportation needs - medical: Not on file    Transportation needs - non-medical: Not on file   Occupational History    Occupation: [employer]     Employer: [employer]   Tobacco Use    Smoking status: Never Smoker    Smokeless tobacco: Never Used   Substance and Sexual Activity    Alcohol use: Yes     Comment: socially    Drug use: No    Sexual activity: Yes     Partners: Male     Birth control/protection: None   Other Topics Concern    Not on file   Social History Narrative    Not on file       Family History  Family History   Problem Relation Age of Onset    Diabetes Mother     Cancer Father     Cancer Sister         Cervical     Ovarian cancer Neg Hx     Colon cancer Neg Hx     Breast cancer Neg Hx      Review of Systems   Constitutional: Negative for fever.   Eyes: Positive for photophobia. Negative for blurred vision and double vision.   Respiratory: Negative for shortness of breath.    Cardiovascular: Negative for chest pain.   Musculoskeletal: Negative for neck pain.   Neurological: Positive for headaches. Negative for dizziness, tingling, tremors, sensory change, speech change, focal weakness and loss of consciousness.   Psychiatric/Behavioral: Negative for depression.         Physical Exam  /84   Pulse 67   Ht 5' 6" (1.676 m)   Wt 73.8 kg (162 lb 11.2 oz)   LMP 02/02/2018 (Within Days)   BMI 26.26 kg/m²     Physical Exam   Constitutional: She is oriented to person, place, and time. No distress.   Eyes: EOM are normal. Pupils are equal, " round, and reactive to light.   Neck:   No neck stifness   Neurological: She is alert and oriented to person, place, and time.         Constitutional  Well-developed, well-nourished, appears stated age   Ophthalmoscopic  No papilledema with no hemorrhages or exudates bilaterally   Cardiovascular  Radial pulses 2+ and symmetric, no LE edema bilaterally   Neurological    * Mental status      - Orientation  Oriented to person, place, time, and situation     - Memory   Intact recent and remote     - Attention/concentration  Attentive, vigilant during exam     - Language  Naming & repetition intact, +2-step commands     - Fund of knowledge  Aware of current events     - Executive  Well-organized thoughts     - Other  Able to do serial 7s and spell WORLD backwards.   * Cranial nerves       - CN II  PERRL, visual fields full to confrontation     - CN III, IV, VI  Extraocular movements full, normal pursuits and saccades     - CN V  Sensation V1 - V3 intact     - CN VII  Face strong and symmetric bilaterally     - CN VIII  Hearing intact bilaterally     - CN IX, X  Palate raises midline and symmetric     - CN XI  SCM and trapezius 5/5 bilaterally     - CN XII  Tongue midline   * Motor  Muscle bulk normal, strength 5/5 throughout   * Sensory   Intact to temperature and vibration throughout   * Coordination  No dysmetria with finger-to-nose or heel-to-shin   * Gait  Normal   * Deep tendon reflexes  2+ and symmetric throughout   Babinski downgoing bilaterally       Lab and Test Results    WBC   Date Value Ref Range Status   07/31/2018 4.28 3.90 - 12.70 K/uL Final   02/16/2018 4.69 3.90 - 12.70 K/uL Final   07/11/2013 6.35 4.8 - 10.8 K/uL Final     Hemoglobin   Date Value Ref Range Status   07/31/2018 11.3 (L) 12.0 - 16.0 g/dL Final   02/16/2018 12.1 12.0 - 16.0 g/dL Final   07/11/2013 13.0 12.0 - 16.0 gm/dl Final     Hematocrit   Date Value Ref Range Status   07/31/2018 35.2 (L) 37.0 - 48.5 % Final   02/16/2018 36.8 (L) 37.0 -  48.5 % Final   07/11/2013 39.1 37.0 - 48.5 % Final     Platelets   Date Value Ref Range Status   07/31/2018 191 150 - 350 K/uL Final   02/16/2018 214 150 - 350 K/uL Final   07/11/2013 245 150 - 350 K/uL Final     Glucose   Date Value Ref Range Status   07/31/2018 96 70 - 110 mg/dL Final   07/11/2013 93 70 - 110 mg/dl Final   10/02/2012 93 70 - 110 mg/dl Final     Sodium   Date Value Ref Range Status   07/31/2018 139 136 - 145 mmol/L Final   07/11/2013 143 136 - 145 mmol/L Final   10/02/2012 140 136 - 145 mmol/L Final     Potassium   Date Value Ref Range Status   07/31/2018 3.6 3.5 - 5.1 mmol/L Final   07/11/2013 3.4 (L) 3.5 - 5.1 mmol/L Final   10/02/2012 3.8 3.5 - 5.1 mmol/L Final     Chloride   Date Value Ref Range Status   07/31/2018 110 95 - 110 mmol/L Final   07/11/2013 108 95 - 110 mmol/L Final   10/02/2012 109 95 - 110 mmol/L Final     CO2   Date Value Ref Range Status   07/31/2018 24 23 - 29 mmol/L Final   07/11/2013 18 (L) 23.0 - 29.0 mmol/L Final   10/02/2012 22 (L) 23.0 - 29.0 mmol/L Final     BUN, Bld   Date Value Ref Range Status   07/31/2018 10 6 - 20 mg/dL Final   07/11/2013 8 6 - 20 mg/dl Final   10/02/2012 8 6 - 20 mg/dl Final     Creatinine   Date Value Ref Range Status   07/31/2018 1.0 0.5 - 1.4 mg/dL Final   07/11/2013 1.0 0.5 - 1.4 mg/dl Final   10/02/2012 0.7 0.5 - 1.4 mg/dl Final     Calcium   Date Value Ref Range Status   07/31/2018 9.1 8.7 - 10.5 mg/dL Final   07/11/2013 10.2 8.7 - 10.5 mg/dl Final   10/02/2012 8.9 8.7 - 10.5 mg/dl Final     Alkaline Phosphatase   Date Value Ref Range Status   07/31/2018 42 (L) 55 - 135 U/L Final   07/11/2013 53 (L) 55 - 135 U/L Final   10/02/2012 42 (L) 55 - 135 U/L Final     ALT   Date Value Ref Range Status   07/31/2018 19 10 - 44 U/L Final   07/11/2013 20 10 - 44 U/L Final   10/02/2012 13 10 - 44 U/L Final     AST   Date Value Ref Range Status   07/31/2018 15 10 - 40 U/L Final   07/11/2013 30 10 - 40 U/L Final   10/02/2012 16 10 - 40 U/L Final          Images:     CT from 2004 reviewed with Dr. Carmichael - No acute abnormality.      Assessment and Plan    Denisse Steen is a 43 y.o. female with a history of migraines as a child which resolved and have recurred since 2004. She has never tried any scheduled medications and resorts to OTC pain meds when her headaches are severe. CT scan in 2004 was normal.    Problem List Items Addressed This Visit        Neuro    Migraine with aura and without status migrainosus, not intractable - Primary    Current Assessment & Plan     -- Amitriptylline 100mg at bedtime. Will help with her sleep and serve as migraine prophylaxis too.  -- Trigger avoidance.  -- F/u in 3months.         Relevant Medications    amitriptyline (ELAVIL) 10 MG tablet    Memory difficulties    Current Assessment & Plan     -- No deficits noted on exam.  -- Likely 2/2 stress.                 Juan Alberto Devlin MD  Neurology Resident   [facility]  [address] [place] [address]  Pager: [phone]

## 2019-02-09 NOTE — ASSESSMENT & PLAN NOTE
-- Amitriptylline 100mg at bedtime. Will help with her sleep and serve as migraine prophylaxis too.  -- Trigger avoidance.  -- F/u in 3months.

## 2019-02-11 NOTE — PROGRESS NOTES
I have seen the patient and reviewed the resident's history, physical, assessment, and plan. I have personally interviewed and examined the patient and agree with the findings.     Blu Carmichael MD  Department of Neurology  [facility]

## 2019-04-25 ENCOUNTER — PATIENT MESSAGE (OUTPATIENT)
Dept: OBSTETRICS AND GYNECOLOGY | Facility: CLINIC | Age: 44
End: 2019-04-25

## 2019-04-29 ENCOUNTER — OFFICE VISIT (OUTPATIENT)
Dept: OBSTETRICS AND GYNECOLOGY | Facility: CLINIC | Age: 44
End: 2019-04-29
Payer: COMMERCIAL

## 2019-04-29 VITALS
BODY MASS INDEX: 25.86 KG/M2 | WEIGHT: 160.94 LBS | DIASTOLIC BLOOD PRESSURE: 70 MMHG | SYSTOLIC BLOOD PRESSURE: 104 MMHG | HEIGHT: 66 IN

## 2019-04-29 DIAGNOSIS — N30.01 ACUTE CYSTITIS WITH HEMATURIA: Primary | ICD-10-CM

## 2019-04-29 LAB
BILIRUB SERPL-MCNC: ABNORMAL MG/DL
BLOOD URINE, POC: ABNORMAL
COLOR, POC UA: YELLOW
GLUCOSE UR QL STRIP: NORMAL
KETONES UR QL STRIP: ABNORMAL
LEUKOCYTE ESTERASE URINE, POC: ABNORMAL
NITRITE, POC UA: ABNORMAL
PH, POC UA: 7
PROTEIN, POC: ABNORMAL
SPECIFIC GRAVITY, POC UA: 1
UROBILINOGEN, POC UA: NORMAL

## 2019-04-29 PROCEDURE — 81002 URINALYSIS NONAUTO W/O SCOPE: CPT | Mod: S$GLB,,, | Performed by: NURSE PRACTITIONER

## 2019-04-29 PROCEDURE — 99214 OFFICE O/P EST MOD 30 MIN: CPT | Mod: 25,S$GLB,, | Performed by: NURSE PRACTITIONER

## 2019-04-29 PROCEDURE — 99214 PR OFFICE/OUTPT VISIT, EST, LEVL IV, 30-39 MIN: ICD-10-PCS | Mod: 25,S$GLB,, | Performed by: NURSE PRACTITIONER

## 2019-04-29 PROCEDURE — 87510 GARDNER VAG DNA DIR PROBE: CPT

## 2019-04-29 PROCEDURE — 87086 URINE CULTURE/COLONY COUNT: CPT

## 2019-04-29 PROCEDURE — 87491 CHLMYD TRACH DNA AMP PROBE: CPT

## 2019-04-29 PROCEDURE — 99999 PR PBB SHADOW E&M-EST. PATIENT-LVL III: ICD-10-PCS | Mod: PBBFAC,,, | Performed by: NURSE PRACTITIONER

## 2019-04-29 PROCEDURE — 99999 PR PBB SHADOW E&M-EST. PATIENT-LVL III: CPT | Mod: PBBFAC,,, | Performed by: NURSE PRACTITIONER

## 2019-04-29 PROCEDURE — 87480 CANDIDA DNA DIR PROBE: CPT

## 2019-04-29 PROCEDURE — 3008F PR BODY MASS INDEX (BMI) DOCUMENTED: ICD-10-PCS | Mod: CPTII,S$GLB,, | Performed by: NURSE PRACTITIONER

## 2019-04-29 PROCEDURE — 3008F BODY MASS INDEX DOCD: CPT | Mod: CPTII,S$GLB,, | Performed by: NURSE PRACTITIONER

## 2019-04-29 PROCEDURE — 81002 POCT URINE DIPSTICK WITHOUT MICROSCOPE: ICD-10-PCS | Mod: S$GLB,,, | Performed by: NURSE PRACTITIONER

## 2019-04-29 RX ORDER — NITROFURANTOIN 25; 75 MG/1; MG/1
100 CAPSULE ORAL 2 TIMES DAILY
Qty: 10 CAPSULE | Refills: 0 | Status: SHIPPED | OUTPATIENT
Start: 2019-04-29 | End: 2019-05-04

## 2019-04-30 ENCOUNTER — PATIENT MESSAGE (OUTPATIENT)
Dept: OBSTETRICS AND GYNECOLOGY | Facility: CLINIC | Age: 44
End: 2019-04-30

## 2019-04-30 LAB
C TRACH DNA SPEC QL NAA+PROBE: NOT DETECTED
CANDIDA RRNA VAG QL PROBE: NEGATIVE
G VAGINALIS RRNA GENITAL QL PROBE: POSITIVE
N GONORRHOEA DNA SPEC QL NAA+PROBE: NOT DETECTED
T VAGINALIS RRNA GENITAL QL PROBE: NEGATIVE

## 2019-04-30 RX ORDER — TINIDAZOLE 500 MG/1
2 TABLET ORAL
Qty: 8 TABLET | Refills: 0 | Status: SHIPPED | OUTPATIENT
Start: 2019-04-30 | End: 2019-05-02

## 2019-05-01 LAB
BACTERIA UR CULT: NORMAL
BACTERIA UR CULT: NORMAL

## 2019-10-04 ENCOUNTER — PATIENT MESSAGE (OUTPATIENT)
Dept: INTERNAL MEDICINE | Facility: CLINIC | Age: 44
End: 2019-10-04

## 2019-10-11 ENCOUNTER — OFFICE VISIT (OUTPATIENT)
Dept: INTERNAL MEDICINE | Facility: CLINIC | Age: 44
End: 2019-10-11
Payer: COMMERCIAL

## 2019-10-11 ENCOUNTER — IMMUNIZATION (OUTPATIENT)
Dept: PHARMACY | Facility: CLINIC | Age: 44
End: 2019-10-11
Payer: COMMERCIAL

## 2019-10-11 DIAGNOSIS — Z83.3 FAMILY HISTORY OF DIABETES MELLITUS: ICD-10-CM

## 2019-10-11 DIAGNOSIS — Z00.00 PREVENTATIVE HEALTH CARE: ICD-10-CM

## 2019-10-11 DIAGNOSIS — Z00.00 HEALTHCARE MAINTENANCE: Primary | ICD-10-CM

## 2019-10-11 DIAGNOSIS — F41.9 MILD ANXIETY: ICD-10-CM

## 2019-10-11 DIAGNOSIS — R53.83 FATIGUE, UNSPECIFIED TYPE: ICD-10-CM

## 2019-10-11 DIAGNOSIS — R35.0 URINARY FREQUENCY: ICD-10-CM

## 2019-10-11 PROCEDURE — 99396 PR PREVENTIVE VISIT,EST,40-64: ICD-10-PCS | Mod: S$GLB,,, | Performed by: STUDENT IN AN ORGANIZED HEALTH CARE EDUCATION/TRAINING PROGRAM

## 2019-10-11 PROCEDURE — 99999 PR PBB SHADOW E&M-EST. PATIENT-LVL III: CPT | Mod: PBBFAC,,, | Performed by: STUDENT IN AN ORGANIZED HEALTH CARE EDUCATION/TRAINING PROGRAM

## 2019-10-11 PROCEDURE — 99999 PR PBB SHADOW E&M-EST. PATIENT-LVL III: ICD-10-PCS | Mod: PBBFAC,,, | Performed by: STUDENT IN AN ORGANIZED HEALTH CARE EDUCATION/TRAINING PROGRAM

## 2019-10-11 PROCEDURE — 99396 PREV VISIT EST AGE 40-64: CPT | Mod: S$GLB,,, | Performed by: STUDENT IN AN ORGANIZED HEALTH CARE EDUCATION/TRAINING PROGRAM

## 2019-10-11 RX ORDER — DULOXETIN HYDROCHLORIDE 30 MG/1
30 CAPSULE, DELAYED RELEASE ORAL DAILY
Qty: 30 CAPSULE | Refills: 1 | Status: SHIPPED | OUTPATIENT
Start: 2019-10-11 | End: 2020-03-27 | Stop reason: SDUPTHER

## 2019-10-11 NOTE — PROGRESS NOTES
Subjective:       Patient ID: Denisse Steen is a 44 y.o. female.    Chief Complaint: Annual Exam    HPI   Ms. Steen is a 45yo F who presents today for annual.   She has multiple complaints including chest pain, neck pain, paresthesias, fatigue, dizziness, HA, stomach pain, difficulties sleeping.     Pt previously seen by neurology for migraines. She was prescribed Amitriptylline 100mg at bedtime. Pt reports not taking it.     She was also previously seen by  Urology for urinary frequency and prescribed mirabegron (previously documented to be on robinul). She reports not taking it and no follow-up.     Despite initial elevated BP today, Pt reports wnl BP readings at home ranging from 112-117/50-60.  Chart review correlates with Pt history.     Pt notes ongoing history of anxiety. She does not endorse a depressed mood and does maintain hobbies. However, does note difficulty sleeping. Does not endorse feelings of hopelessness, helplessness, guilt, or thoughts of S/HI.    Pt reports being active and endorses well rounded diet.     Past Medical History:   Diagnosis Date    Abnormal Pap smear     Anemia     Anxiety 7/18/2013    Blood transfusion 2008     Past Surgical History:   Procedure Laterality Date    ADENOIDECTOMY      CERVICAL BIOPSY  W/ LOOP ELECTRODE EXCISION      DILATION AND CURETTAGE OF UTERUS      HYSTERECTOMY  02/20/2018    OVARIAN CYST REMOVAL      TONSILLECTOMY      WISDOM TOOTH EXTRACTION       Review of patient's allergies indicates:  No Known Allergies    Family History   Problem Relation Age of Onset    Diabetes Mother     Uterine cancer Mother     Cancer Father         lung    Cancer Sister         colon and cervical    Ovarian cancer Neg Hx     Colon cancer Neg Hx     Breast cancer Neg Hx      Social History     Socioeconomic History    Marital status:      Spouse name: Ty    Number of children: 1    Years of education: Not on file    Highest education  level: Not on file   Occupational History    Occupation:    Social Needs    Financial resource strain: Not hard at all    Food insecurity:     Worry: Never true     Inability: Never true    Transportation needs:     Medical: No     Non-medical: No   Tobacco Use    Smoking status: Never Smoker    Smokeless tobacco: Never Used   Substance and Sexual Activity    Alcohol use: Yes     Frequency: 4 or more times a week     Drinks per session: 1 or 2     Binge frequency: Weekly     Comment: socially    Drug use: No    Sexual activity: Yes     Partners: Male     Birth control/protection: None   Lifestyle    Physical activity:     Days per week: 0 days     Minutes per session: 0 min    Stress: Very much   Relationships    Social connections:     Talks on phone: More than three times a week     Gets together: Once a week     Attends Yarsanism service: Not on file     Active member of club or organization: No     Attends meetings of clubs or organizations: Never     Relationship status:    Other Topics Concern    Not on file   Social History Narrative    Not on file     Current Outpatient Medications on File Prior to Visit   Medication Sig Dispense Refill Last Dose    [DISCONTINUED] amitriptyline (ELAVIL) 10 MG tablet Take 1 tablet (10 mg total) by mouth every evening. 90 tablet 3 Taking     Medications have been reviewed and reconciled.    Review of Systems   Constitutional: Positive for fatigue. Negative for appetite change, chills, fever and unexpected weight change.   HENT: Positive for ear pain. Negative for congestion, rhinorrhea, sinus pain, sneezing, sore throat and trouble swallowing.    Eyes: Negative for visual disturbance.   Respiratory: Positive for shortness of breath. Negative for cough and wheezing.    Cardiovascular: Positive for chest pain. Negative for palpitations and leg swelling.   Gastrointestinal: Positive for abdominal pain. Negative for constipation, diarrhea,  "nausea and vomiting.   Genitourinary: Positive for frequency. Negative for dysuria and vaginal discharge.   Musculoskeletal: Positive for arthralgias, myalgias and neck pain.   Skin: Positive for rash.   Neurological: Positive for dizziness and headaches. Negative for light-headedness.   Psychiatric/Behavioral: Positive for dysphoric mood. The patient is nervous/anxious.        Objective:         Vitals:    10/11/19 1021 10/11/19 1025   BP: (!) 142/97 138/82   Pulse: 71    Weight: 74.3 kg (163 lb 12.8 oz)    Height: 5' 6" (1.676 m)        Physical Exam   Constitutional: She is oriented to person, place, and time. No distress.   HENT:   Head: Normocephalic and atraumatic.   Right Ear: External ear normal.   Left Ear: External ear normal.   Nose: Nose normal.   Mouth/Throat: Oropharynx is clear and moist. No oropharyngeal exudate.   Eyes: Conjunctivae are normal. No scleral icterus.   Neck: Normal range of motion. Neck supple. No thyromegaly present.   Cardiovascular: Normal rate, regular rhythm, normal heart sounds and intact distal pulses. Exam reveals no gallop and no friction rub.   No murmur heard.  Pulmonary/Chest: Effort normal and breath sounds normal. She has no wheezes. She has no rales.   Abdominal: Soft. Bowel sounds are normal. There is no tenderness. There is no guarding.   Musculoskeletal: She exhibits no edema.   Lymphadenopathy:     She has no cervical adenopathy.   Neurological: She is alert and oriented to person, place, and time. She has normal strength. No cranial nerve deficit or sensory deficit.   Skin: Skin is warm and dry. She is not diaphoretic.   Psychiatric: She has a normal mood and affect. Her speech is normal and behavior is normal. Thought content normal.   Nursing note and vitals reviewed.      Assessment:       1. Healthcare maintenance    2. Preventative health care    3. Mild anxiety    4. Family history of diabetes mellitus    5. Urinary frequency    6. Fatigue, unspecified type  "       Plan:       Denisse was seen today for annual exam.    Diagnoses and all orders for this visit:    Healthcare maintenance  -     Lipid panel; Future  -     Comprehensive metabolic panel; Future    Preventative health care  -     Lipid panel; Future    Mild anxiety  -     DULoxetine (CYMBALTA) 30 MG capsule; Take 1 capsule (30 mg total) by mouth once daily.    Family history of diabetes mellitus  -     Hemoglobin A1c; Future    Urinary frequency  -     Hemoglobin A1c; Future    Fatigue, unspecified type  -     Comprehensive metabolic panel; Future  -     CBC auto differential; Future      RTC in 3 weeks for close follow-up.  Will initiate Cymbalta today for chronic pain, paresthesias, and anxiety.  May require increase in dose as tolerated.   TSH one year ago wnl.   Flu shot today and basic labs.      Discussed with Dr. Tena.

## 2019-10-11 NOTE — PATIENT INSTRUCTIONS
Bring in your blood pressure log at your next visit in 3-4 weeks.    Schedule with your neurologist.     Eating Heart-Healthy Food: Using the DASH Plan      Eating for your heart doesnt have to be hard or boring. You just need to know how to make healthier choices. The DASH eating plan has been developed to help you do just that. DASH stands for Dietary Approaches to Stop Hypertension. It is a plan that has been proven to be healthier for your heart and to lower your risk for high blood pressure. It can also help lower your risk for cancer, heart disease, osteoporosis, and diabetes.  Choosing from each food group  Choose foods from each of the food groups below each day. Try to get the recommended number of servings for each food group. The serving numbers are based on a diet of 2,000 calories a day. Talk to your doctor if youre unsure about your calorie needs. Along with getting the correct servings, the DASH plan also recommends a sodium intake less than 2,300 mg per day.        Grains  Servings: 6 to 8 a day  A serving is:  · 1 slice bread  · 1 ounce dry cereal  · Half a cup cooked rice, pasta or cereal  Best choices: Whole grains and any grains high in fiber. Vegetables  Servings: 4 to 5 a day  A serving is:  · 1 cup raw leafy vegetable  · Half a cup cut-up raw or cooked vegetable  · Half a cup vegetable juice  Best choices: Fresh or frozen vegetables prepared without added salt or fat.   Fruits  Servings: 4 to 5 a day  A serving is:  · 1 medium fruit  · One-quarter cup dried fruit  · Half a cup fresh, frozen, or canned fruit  · Half a cup of 100% fruit juices  Best choices: A variety of fresh fruits of different colors. Whole fruits are a better choice than fruit juices. Low-fat or fat-free dairy  Servings: 2 to 3 a day  A serving is:  · 1 cup milk  · 1 cup yogurt  · One and a half ounces cheese  Best choices: Skim or 1% milk, low-fat or fat-free yogurt or buttermilk, and low-fat cheeses.         Lean  meats, poultry, fish  Servings: 6 or fewer a day  A serving is:  · 1 ounce cooked meats, poultry, or fish  · 1 egg  Best choices: Lean poultry and fish. Trim away visible fat. Broil, grill, roast, or boil instead of frying. Remove skin from poultry before eating. Limit how much red meat you eat.  Nuts, seeds, beans  Servings: 4 to 5 a week  A serving is:  · One-third cup nuts (one and a half ounces)  · 2 tablespoons nut butter or seeds  · Half a cup cooked dry beans or legumes  Best choices: Dry roasted nuts with no salt added, lentils, kidney beans, garbanzo beans, and whole main beans.   Fats and oils  Servings: 2 to 3 a day  A serving is:  · 1 teaspoon vegetable oil  · 1 teaspoon soft margarine  · 1 tablespoon mayonnaise  · 2 tablespoons salad dressing  Best choices: Nut and vegetable oils (nontropical vegetable oils), such as olive and canola oil. Sweets  Servings: 5 a week or fewer  A serving is:  · 1 tablespoon sugar, maple syrup, or honey  · 1 tablespoon jam or jelly  · 1 half-ounce jelly beans (about 15)  · 1 cup lemonade  Best choices: Dried fruit can be a satisfying sweet. Choose low-fat sweets. And watch your serving sizes!      For more on the DASH eating plan, visit:  www.nhlbi.nih.gov/health/health-topics/topics/dash   Date Last Reviewed: 6/1/2016  © 0492-7196 [employer]. [address] [place] [address]. All rights reserved. This information is not intended as a substitute for professional medical care. Always follow your healthcare professional's instructions.            Low-Salt Diet  This diet removes foods that are high in salt. It also limits the amount of salt you use when cooking. It is most often used for people with high blood pressure, edema (fluid retention), and kidney, liver, or heart disease.  Table salt contains the mineral sodium. Your body needs sodium to work normally. But too much sodium can make your health problems worse. Your healthcare provider is  recommending a low-salt (also called low-sodium) diet for you. Your total daily allowance of salt is 1,500 to 2,300 milligrams (mg). It is less than 1 teaspoon of table salt. This means you can have only about 500 to 700 mg of sodium at each meal. People with certain health problems should limit salt intake to the lower end of the recommended range.    When you cook, dont add much salt. If you can cook without using salt, even better. Dont add salt to your food at the table.  When shopping, read food labels. Salt is often called sodium on the label. Choose foods that are salt-free, low salt, or very low salt. Note that foods with reduced salt may not lower your salt intake enough.    Beans, potatoes, and pasta  Ok: Dry beans, split peas, lentils, potatoes, rice, macaroni, pasta, spaghetti without added salt  Avoid: Potato chips, tortilla chips, and similar products  Breads and cereals  Ok: Low-sodium breads, rolls, cereals, and cakes; low-salt crackers, matzo crackers  Avoid: Salted crackers, pretzels, popcorn, [place]anese toast, pancakes, muffins  Dairy  Ok: Milk, chocolate milk, hot chocolate mix, low-salt cheeses, and yogurt  Avoid: Processed cheese and cheese spreads; Roquefort, Camembert, and cottage cheese; buttermilk, instant breakfast drink  Desserts  Ok: Ice cream, frozen yogurt, juice bars, gelatin, cookies and pies, sugar, honey, jelly, hard candy  Avoid: Most pies, cakes and cookies prepared or processed with salt; instant pudding  Drinks  Ok: Tea, coffee, fizzy (carbonated) drinks, juices  Avoid: Flavored coffees, electrolyte replacement drinks, sports drinks  Meats  Ok: All fresh meat, fish, poultry, low-salt tuna, eggs, egg substitute  Avoid: Smoked, pickled, brine-cured, or salted meats and fish. This includes escamilla, chipped beef, corned beef, hot dogs, deli meats, ham, kosher meats, salt pork, sausage, canned tuna, salted codfish, smoked salmon, herring, sardines, or anchovies.  Seasonings and  spices  Ok: Most seasonings are okay. Good substitutes for salt include: fresh herb blends, hot sauce, lemon, garlic, ames, vinegar, dry mustard, parsley, cilantro, horseradish, tomato paste, regular margarine, mayonnaise, unsalted butter, cream cheese, vegetable oil, cream, low-salt salad dressing and gravy.  Avoid: Regular ketchup, relishes, pickles, soy sauce, teriyaki sauce, Worcestershire sauce, BBQ sauce, tartar sauce, meat tenderizer, chili sauce, regular gravy, regular salad dressing, salted butter  Soups  Ok: Low-salt soups and broths made with allowed foods  Avoid: Bouillon cubes, soups with smoked or salted meats, regular soup and broth  Vegetables  Ok: Most vegetables are okay; also low-salt tomato and vegetable juices  Avoid: Sauerkraut and other brine-soaked vegetables; pickles and other pickled vegetables; tomato juice, olives  Date Last Reviewed: 8/1/2016  © 0864-5358 [employer]. [address] [place] [address]. All rights reserved. This information is not intended as a substitute for professional medical care. Always follow your healthcare professional's instructions.            Duloxetine     What is this medicine?  DULOXETINE (doo LOX e teen) is used to treat depression, anxiety, and different types of chronic pain.    How should I use this medicine?  Take this medicine by mouth with a glass of water. Follow the directions on the prescription label. Do not cut, crush or chew this medicine. You can take this medicine with or without food. Take your medicine at regular intervals. Do not take your medicine more often than directed. Do not stop taking this medicine suddenly except upon the advice of your doctor. Stopping this medicine too quickly may cause serious side effects or your condition may worsen.  A special MedGuide will be given to you by the pharmacist with each prescription and refill. Be sure to read this information carefully each time.  Talk to your  pediatrician regarding the use of this medicine in children. While this drug may be prescribed for children as young as 7 years of age for selected conditions, precautions do apply.    What side effects may I notice from receiving this medicine?  Side effects that you should report to your doctor or health care professional as soon as possible:  · allergic reactions like skin rash, itching or hives, swelling of the face, lips, or tongue  · changes in blood pressure  · confusion  · dark urine  · dizziness  · fast talking and excited feelings or actions that are out of control  · fast, irregular heartbeat  · fever  · general ill feeling or flu-like symptoms  · hallucination, loss of contact with reality  · light-colored stools  · loss of balance or coordination  · redness, blistering, peeling or loosening of the skin, including inside the mouth  · right upper belly pain  · seizures  · suicidal thoughts or other mood changes  · trouble concentrating  · trouble passing urine or change in the amount of urine  · unusual bleeding or bruising  · unusually weak or tired  · yellowing of the eyes or skin  Side effects that usually do not require medical attention (report to your doctor or health care professional if they continue or are bothersome):  · blurred vision  · change in appetite  · change in sex drive or performance  · headache  · increased sweating  · nausea  What may interact with this medicine?  Do not take this medicine with any of the following medications:  · certain diet drugs like dexfenfluramine, fenfluramine  · desvenlafaxine  · linezolid  · MAOIs like Azilect, Carbex, Eldepryl, Marplan, Nardil, and Parnate  · methylene blue (intravenous)  · milnacipran  · thioridazine  · venlafaxine  This medicine may also interact with the following medications:  · alcohol  · aspirin and aspirin-like medicines  · certain antibiotics like ciprofloxacin and enoxacin  · certain medicines for blood pressure, heart disease,  irregular heart beat  · certain medicines for depression, anxiety, or psychotic disturbances  · certain medicines for migraine headache like almotriptan, eletriptan, frovatriptan, naratriptan, rizatriptan, sumatriptan, zolmitriptan  · certain medicines that treat or prevent blood clots like warfarin, enoxaparin, and dalteparin  · cimetidine  · fentanyl  · lithium  · NSAIDS, medicines for pain and inflammation, like ibuprofen or naproxen  · phentermine  · procarbazine  · sibutramine  · [place]'s wort  · theophylline  · tramadol  · tryptophan  What if I miss a dose?  If you miss a dose, take it as soon as you can. If it is almost time for your next dose, take only that dose. Do not take double or extra doses.  Where should I keep my medicine?  Keep out of the reach of children.  Store at room temperature between 20 and 25 degrees C (68 to 77 degrees F). Throw away any unused medicine after the expiration date.  What should I tell my health care provider before I take this medicine?  They need to know if you have any of these conditions:  · bipolar disorder or a family history of bipolar disorder  · glaucoma  · kidney disease  · liver disease  · suicidal thoughts or a previous suicide attempt  · taken medicines called MAOIs like Carbex, Eldepryl, Marplan, Nardil, and Parnate within 14 days  · an unusual reaction to duloxetine, other medicines, foods, dyes, or preservatives  · pregnant or trying to get pregnant  · breast-feeding  What should I watch for while using this medicine?  Tell your doctor if your symptoms do not get better or if they get worse. Visit your doctor or health care professional for regular checks on your progress. Because it may take several weeks to see the full effects of this medicine, it is important to continue your treatment as prescribed by your doctor.  Patients and their families should watch out for new or worsening thoughts of suicide or depression. Also watch out for sudden changes in  feelings such as feeling anxious, agitated, panicky, irritable, hostile, aggressive, impulsive, severely restless, overly excited and hyperactive, or not being able to sleep. If this happens, especially at the beginning of treatment or after a change in dose, call your health care professional.  You may get drowsy or dizzy. Do not drive, use machinery, or do anything that needs mental alertness until you know how this medicine affects you. Do not stand or sit up quickly, especially if you are an older patient. This reduces the risk of dizzy or fainting spells. Alcohol may interfere with the effect of this medicine. Avoid alcoholic drinks.  This medicine can cause an increase in blood pressure. This medicine can also cause a sudden drop in your blood pressure, which may make you feel faint and increase the chance of a fall. These effects are most common when you first start the medicine or when the dose is increased, or during use of other medicines that can cause a sudden drop in blood pressure. Check with your doctor for instructions on monitoring your blood pressure while taking this medicine.  Your mouth may get dry. Chewing sugarless gum or sucking hard candy, and drinking plenty of water may help. Contact your doctor if the problem does not go away or is severe.  NOTE:This sheet is a summary. It may not cover all possible information. If you have questions about this medicine, talk to your doctor, pharmacist, or health care provider. Copyright© 2017 [employer]

## 2019-10-15 ENCOUNTER — PATIENT MESSAGE (OUTPATIENT)
Dept: INTERNAL MEDICINE | Facility: CLINIC | Age: 44
End: 2019-10-15

## 2019-10-15 VITALS
WEIGHT: 163.81 LBS | HEART RATE: 71 BPM | DIASTOLIC BLOOD PRESSURE: 82 MMHG | HEIGHT: 66 IN | BODY MASS INDEX: 26.33 KG/M2 | SYSTOLIC BLOOD PRESSURE: 138 MMHG

## 2019-10-15 DIAGNOSIS — D64.9 ANEMIA, UNSPECIFIED TYPE: Primary | ICD-10-CM

## 2019-10-16 NOTE — PROGRESS NOTES
"I have personally taken the history and examined this patient and agree with the resident's note as stated above with the following thoughts:  /82   Pulse 71   Ht 5' 6" (1.676 m)   Wt 74.3 kg (163 lb 12.8 oz)   LMP 02/02/2018 (Within Days)   BMI 26.44 kg/m²     Discussed getting vaccines up to date.  Discussed importance of low fat diet and exercise     Answers for HPI/ROS submitted by the patient on 10/7/2019   Shortness of breath  Chronicity: recurrent  Onset: in the past 7 days  Frequency: intermittently  Progression since onset: unchanged  Episode duration: 2 hours  abdominal pain: Yes  chest pain: Yes  claudication: Yes  coryza: No  ear pain: Yes  fever: No  headaches: Yes  hemoptysis: No  leg pain: Yes  leg swelling: No  neck pain: Yes  orthopnea: Yes  PND: Yes  rash: Yes  rhinorrhea: No  sore throat: No  sputum production: No  swollen glands: No  syncope: No  vomiting: No  wheezing: No  Aggravating factors: emotional upset, smoke  Improvement on treatment: no relief  Risk factors for DVT/PE: no known risk factors  Treatments tried: nothing  asthma: No  allergies: No  COPD: No  pneumonia: No  aspirin allergies: No  CAD: No  DVT: No  heart failure: No  PE: No  recent surgery: No  bronchiolitis: No  chronic lung disease: No    "

## 2019-10-29 ENCOUNTER — PATIENT MESSAGE (OUTPATIENT)
Dept: INTERNAL MEDICINE | Facility: CLINIC | Age: 44
End: 2019-10-29

## 2019-12-09 ENCOUNTER — OFFICE VISIT (OUTPATIENT)
Dept: CARDIOLOGY | Facility: CLINIC | Age: 44
End: 2019-12-09
Payer: COMMERCIAL

## 2019-12-09 ENCOUNTER — HOSPITAL ENCOUNTER (OUTPATIENT)
Dept: CARDIOLOGY | Facility: CLINIC | Age: 44
Discharge: HOME | End: 2019-12-09
Attending: STUDENT IN AN ORGANIZED HEALTH CARE EDUCATION/TRAINING PROGRAM
Payer: COMMERCIAL

## 2019-12-09 VITALS
BODY MASS INDEX: 26.19 KG/M2 | SYSTOLIC BLOOD PRESSURE: 142 MMHG | OXYGEN SATURATION: 100 % | HEIGHT: 66 IN | WEIGHT: 162.94 LBS | HEART RATE: 70 BPM | DIASTOLIC BLOOD PRESSURE: 74 MMHG

## 2019-12-09 DIAGNOSIS — R07.9 CHEST PAIN, UNSPECIFIED TYPE: ICD-10-CM

## 2019-12-09 DIAGNOSIS — R07.9 CHEST PAIN, UNSPECIFIED TYPE: Primary | ICD-10-CM

## 2019-12-09 LAB
CV STRESS BASE HR: 62 BPM
DIASTOLIC BLOOD PRESSURE: 86 MMHG
OHS CV CPX 1 MINUTE RECOVERY HEART RATE: 153 BPM
OHS CV CPX 85 PERCENT MAX PREDICTED HEART RATE MALE: 143
OHS CV CPX ESTIMATED METS: 13
OHS CV CPX MAX PREDICTED HEART RATE: 168
OHS CV CPX PATIENT IS FEMALE: 1
OHS CV CPX PATIENT IS MALE: 0
OHS CV CPX PEAK DIASTOLIC BLOOD PRESSURE: 78 MMHG
OHS CV CPX PEAK HEAR RATE: 179 BPM
OHS CV CPX PEAK RATE PRESSURE PRODUCT: NORMAL
OHS CV CPX PEAK SYSTOLIC BLOOD PRESSURE: 169 MMHG
OHS CV CPX PERCENT MAX PREDICTED HEART RATE ACHIEVED: 106
OHS CV CPX RATE PRESSURE PRODUCT PRESENTING: 7440
STRESS ECHO POST EXERCISE DUR MIN: 8 MINUTES
STRESS ECHO POST EXERCISE DUR SEC: 0 SECONDS
STRESS ECHO TARGET HR: 150 BPM
SYSTOLIC BLOOD PRESSURE: 120 MMHG

## 2019-12-09 PROCEDURE — 99204 OFFICE O/P NEW MOD 45 MIN: CPT | Mod: S$GLB,,, | Performed by: STUDENT IN AN ORGANIZED HEALTH CARE EDUCATION/TRAINING PROGRAM

## 2019-12-09 PROCEDURE — 99999 PR PBB SHADOW E&M-EST. PATIENT-LVL III: ICD-10-PCS | Mod: PBBFAC,,, | Performed by: STUDENT IN AN ORGANIZED HEALTH CARE EDUCATION/TRAINING PROGRAM

## 2019-12-09 PROCEDURE — 93015 EXERCISE STRESS - EKG (CUPID ONLY): ICD-10-PCS | Mod: S$GLB,,, | Performed by: INTERNAL MEDICINE

## 2019-12-09 PROCEDURE — 99999 PR PBB SHADOW E&M-EST. PATIENT-LVL III: CPT | Mod: PBBFAC,,, | Performed by: STUDENT IN AN ORGANIZED HEALTH CARE EDUCATION/TRAINING PROGRAM

## 2019-12-09 PROCEDURE — 3008F PR BODY MASS INDEX (BMI) DOCUMENTED: ICD-10-PCS | Mod: CPTII,S$GLB,, | Performed by: STUDENT IN AN ORGANIZED HEALTH CARE EDUCATION/TRAINING PROGRAM

## 2019-12-09 PROCEDURE — 93000 ELECTROCARDIOGRAM COMPLETE: CPT | Mod: S$GLB,,, | Performed by: INTERNAL MEDICINE

## 2019-12-09 PROCEDURE — 93000 EKG 12-LEAD: ICD-10-PCS | Mod: S$GLB,,, | Performed by: INTERNAL MEDICINE

## 2019-12-09 PROCEDURE — 93015 CV STRESS TEST SUPVJ I&R: CPT | Mod: S$GLB,,, | Performed by: INTERNAL MEDICINE

## 2019-12-09 PROCEDURE — 99204 PR OFFICE/OUTPT VISIT, NEW, LEVL IV, 45-59 MIN: ICD-10-PCS | Mod: S$GLB,,, | Performed by: STUDENT IN AN ORGANIZED HEALTH CARE EDUCATION/TRAINING PROGRAM

## 2019-12-09 PROCEDURE — 3008F BODY MASS INDEX DOCD: CPT | Mod: CPTII,S$GLB,, | Performed by: STUDENT IN AN ORGANIZED HEALTH CARE EDUCATION/TRAINING PROGRAM

## 2019-12-09 NOTE — PROGRESS NOTES
PCP - Shruti Momin MD  Referring Physician:  Dr. Bahena / Darinel    Subjective:   Patient ID:  Denisse Steen is a 44 y.o. y.o. female who presents for evaluation and treatment of chest pain.    - PMHx of Migraine with aura, Depression with anxiety, menorrhagia s/p partial lap hysterectomy.    - , nonsmoker (extensive 2nd hand smoking), +etOH, no illicit drug    Patient was seen in medicine clinic on 10/15/19 with many vague complaints compiling different systems, concerns for worsening depression / psych issues, Cymbalta was initiated. Patient c/o substernal chest pain for 1 week out of the month, radiating to the back. Sharp discomfort 5 - 6 / 10, last ~ 1 hour. Alleviated with resting. Associated with lightheadedness, dizziness, SOB and palpitations. Denies Syncope, no history of PE/DVT. Has had chest pain while working at the [employer]. Symptoms off /on for couple months. She reports anxiety / depression has been stable and pleased with the results. She used work out with sit ups / Squats 3 -4x weekly, stopped doing this because of the holidays, never had chest pain with her work outs.     No family history of MI/CAD, mother with HF medication induced. Patient completed a EDNA for work purposes (2018), reportedly it was negative, not complete at [facility].     A1c 5.2  Cr 0.9   Lipid:  / TG 63 / LDL 63 / HDL 57  EKG 2012: NSR, no acute ST changes   No TTE on file    History:     Social History     Tobacco Use    Smoking status: Never Smoker    Smokeless tobacco: Never Used   Substance Use Topics    Alcohol use: Yes     Frequency: 4 or more times a week     Drinks per session: 1 or 2     Binge frequency: Weekly     Comment: socially     Family History   Problem Relation Age of Onset    Diabetes Mother     Uterine cancer Mother     Cancer Father         lung    Cancer Sister         colon and cervical    Ovarian cancer Neg Hx     Colon cancer Neg Hx     Breast  cancer Neg Hx        Meds:   Review of patient's allergies indicates:  No Known Allergies    Current Outpatient Medications:     DULoxetine (CYMBALTA) 30 MG capsule, Take 1 capsule (30 mg total) by mouth once daily., Disp: 30 capsule, Rfl: 1    Review of Systems   Constitutional: Negative for chills, fever, malaise/fatigue and weight loss.   Respiratory: Negative for cough, sputum production and shortness of breath.    Cardiovascular: Negative for chest pain, palpitations, orthopnea, claudication, leg swelling and PND.   Gastrointestinal: Negative for abdominal pain, diarrhea, nausea and vomiting.   Genitourinary: Negative for dysuria and urgency.       Objective:   LMP 02/02/2018 (Within Days)   Physical Exam   Constitutional: She is oriented to person, place, and time. No distress.   HENT:   Head: Normocephalic and atraumatic.   Neck: Normal range of motion. Neck supple. No JVD present.   Cardiovascular: Normal rate, regular rhythm, normal heart sounds and intact distal pulses. Exam reveals no gallop and no friction rub.   No murmur heard.  Pulmonary/Chest: Effort normal and breath sounds normal. No respiratory distress. She has no wheezes. She has no rales.   Abdominal: Soft. Bowel sounds are normal. She exhibits no distension. There is no tenderness.   Musculoskeletal: Normal range of motion.   Neurological: She is alert and oriented to person, place, and time.   Skin: Skin is warm and dry. She is not diaphoretic.       Labs:     Lab Results   Component Value Date     10/11/2019    K 4.1 10/11/2019     10/11/2019    CO2 26 10/11/2019    BUN 11 10/11/2019    CREATININE 0.9 10/11/2019    ANIONGAP 10 10/11/2019     Lab Results   Component Value Date    HGBA1C 5.2 10/11/2019     Lab Results   Component Value Date    BNP 28 05/06/2010       Lab Results   Component Value Date    WBC 4.30 10/11/2019    HGB 11.8 (L) 10/11/2019    HCT 35.9 (L) 10/11/2019     10/11/2019    GRAN 2.3 10/11/2019    GRAN  52.4 10/11/2019     Lab Results   Component Value Date    CHOL 219 (H) 10/11/2019    HDL 57 10/11/2019    LDLCALC 149.4 10/11/2019    TRIG 63 10/11/2019       Lab Results   Component Value Date     10/11/2019    K 4.1 10/11/2019     10/11/2019    CO2 26 10/11/2019    BUN 11 10/11/2019    CREATININE 0.9 10/11/2019    ANIONGAP 10 10/11/2019     Lab Results   Component Value Date    HGBA1C 5.2 10/11/2019     Lab Results   Component Value Date    BNP 28 05/06/2010    Lab Results   Component Value Date    WBC 4.30 10/11/2019    HGB 11.8 (L) 10/11/2019    HCT 35.9 (L) 10/11/2019     10/11/2019    GRAN 2.3 10/11/2019    GRAN 52.4 10/11/2019     Lab Results   Component Value Date    CHOL 219 (H) 10/11/2019    HDL 57 10/11/2019    LDLCALC 149.4 10/11/2019    TRIG 63 10/11/2019                Cardiovascular Imaging:     Stress test: a EDNA for work purposes (2018), reportedly it was negative, not complete at [facility].     Assessment & Plan:     Denisse Steen is a 44 y.o. y.o. Female with atypical chest pain. Extensive history of Migraine with aura, Depression with anxiety. Her RF include extensive 2nd hand smoking, elevated BP (no history of HTN)   - completed a EDNA for work purposes (2018), reportedly it was negative, not complete at [facility].    - A1c 5.2   - Cr 0.9    - Lipid:  / TG 63 / LDL 63 / HDL 57    - RTC PRN, will call patient and discuss results   - Exercise EKG ordered  - Monitor BP, treat with anti-HTN (goal BP <120/90)  - Clinic EKG: SB with nonspecific ST changes lead V3  - Continue diet, exercise with goal of weight loss  - Worsening chest pain seek immediate medical attention.       Signed:  Leonid Rodriguez M.D.  Page # [phone]  Cardiovascular Fellow PGY-V  [facility]

## 2019-12-09 NOTE — PROGRESS NOTES
I have personally taken the history and examined this patient and agree with the fellow's note as stated above.Her chest discomfort is atypical for cardiac origin. Stress test ordered.

## 2019-12-10 ENCOUNTER — TELEPHONE (OUTPATIENT)
Dept: CARDIOLOGY | Facility: CLINIC | Age: 44
End: 2019-12-10

## 2019-12-10 NOTE — TELEPHONE ENCOUNTER
Interval update:     Patient updated EKG stress negative for inducible ischemia.     Leonid Rodriguez M.D.  Page # [phone]  Cardiovascular Fellow PGY-V  [facility]

## 2020-03-17 ENCOUNTER — PATIENT MESSAGE (OUTPATIENT)
Dept: INTERNAL MEDICINE | Facility: CLINIC | Age: 45
End: 2020-03-17

## 2020-03-17 DIAGNOSIS — F41.9 MILD ANXIETY: ICD-10-CM

## 2020-03-27 RX ORDER — DULOXETIN HYDROCHLORIDE 30 MG/1
30 CAPSULE, DELAYED RELEASE ORAL DAILY
Qty: 90 CAPSULE | Refills: 3 | Status: SHIPPED | OUTPATIENT
Start: 2020-03-27 | End: 2020-11-13

## 2020-07-21 ENCOUNTER — PATIENT MESSAGE (OUTPATIENT)
Dept: INTERNAL MEDICINE | Facility: CLINIC | Age: 45
End: 2020-07-21

## 2020-11-13 ENCOUNTER — LAB VISIT (OUTPATIENT)
Dept: LAB | Facility: HOSPITAL | Age: 45
End: 2020-11-13
Payer: COMMERCIAL

## 2020-11-13 ENCOUNTER — OFFICE VISIT (OUTPATIENT)
Dept: INTERNAL MEDICINE | Facility: CLINIC | Age: 45
End: 2020-11-13
Payer: COMMERCIAL

## 2020-11-13 ENCOUNTER — IMMUNIZATION (OUTPATIENT)
Dept: INTERNAL MEDICINE | Facility: CLINIC | Age: 45
End: 2020-11-13
Payer: COMMERCIAL

## 2020-11-13 VITALS
HEIGHT: 66 IN | OXYGEN SATURATION: 99 % | BODY MASS INDEX: 27.46 KG/M2 | SYSTOLIC BLOOD PRESSURE: 120 MMHG | HEART RATE: 59 BPM | DIASTOLIC BLOOD PRESSURE: 80 MMHG | WEIGHT: 170.88 LBS

## 2020-11-13 DIAGNOSIS — F32.A ANXIETY AND DEPRESSION: ICD-10-CM

## 2020-11-13 DIAGNOSIS — D64.9 ANEMIA, UNSPECIFIED TYPE: ICD-10-CM

## 2020-11-13 DIAGNOSIS — F41.9 ANXIETY AND DEPRESSION: ICD-10-CM

## 2020-11-13 DIAGNOSIS — E66.3 OVERWEIGHT: ICD-10-CM

## 2020-11-13 DIAGNOSIS — Z00.00 HEALTHCARE MAINTENANCE: ICD-10-CM

## 2020-11-13 DIAGNOSIS — Z00.00 ANNUAL PHYSICAL EXAM: Primary | ICD-10-CM

## 2020-11-13 DIAGNOSIS — R53.83 LOW ENERGY: ICD-10-CM

## 2020-11-13 LAB
25(OH)D3+25(OH)D2 SERPL-MCNC: 17 NG/ML (ref 30–96)
ALBUMIN SERPL BCP-MCNC: 4.2 G/DL (ref 3.5–5.2)
ALP SERPL-CCNC: 35 U/L (ref 55–135)
ALT SERPL W/O P-5'-P-CCNC: 16 U/L (ref 10–44)
ANION GAP SERPL CALC-SCNC: 8 MMOL/L (ref 8–16)
AST SERPL-CCNC: 17 U/L (ref 10–40)
BASOPHILS # BLD AUTO: 0.01 K/UL (ref 0–0.2)
BASOPHILS NFR BLD: 0.2 % (ref 0–1.9)
BILIRUB SERPL-MCNC: 0.5 MG/DL (ref 0.1–1)
BUN SERPL-MCNC: 8 MG/DL (ref 6–20)
CALCIUM SERPL-MCNC: 9.5 MG/DL (ref 8.7–10.5)
CHLORIDE SERPL-SCNC: 107 MMOL/L (ref 95–110)
CHOLEST SERPL-MCNC: 241 MG/DL (ref 120–199)
CHOLEST/HDLC SERPL: 3.4 {RATIO} (ref 2–5)
CO2 SERPL-SCNC: 25 MMOL/L (ref 23–29)
CREAT SERPL-MCNC: 0.9 MG/DL (ref 0.5–1.4)
DIFFERENTIAL METHOD: ABNORMAL
EOSINOPHIL # BLD AUTO: 0 K/UL (ref 0–0.5)
EOSINOPHIL NFR BLD: 0.2 % (ref 0–8)
ERYTHROCYTE [DISTWIDTH] IN BLOOD BY AUTOMATED COUNT: 12.7 % (ref 11.5–14.5)
EST. GFR  (AFRICAN AMERICAN): >60 ML/MIN/1.73 M^2
EST. GFR  (NON AFRICAN AMERICAN): >60 ML/MIN/1.73 M^2
FOLATE SERPL-MCNC: 12.7 NG/ML (ref 4–24)
GLUCOSE SERPL-MCNC: 95 MG/DL (ref 70–110)
HCT VFR BLD AUTO: 39.2 % (ref 37–48.5)
HDLC SERPL-MCNC: 70 MG/DL (ref 40–75)
HDLC SERPL: 29 % (ref 20–50)
HGB BLD-MCNC: 12.1 G/DL (ref 12–16)
IMM GRANULOCYTES # BLD AUTO: 0.01 K/UL (ref 0–0.04)
IMM GRANULOCYTES NFR BLD AUTO: 0.2 % (ref 0–0.5)
LDLC SERPL CALC-MCNC: 149 MG/DL (ref 63–159)
LYMPHOCYTES # BLD AUTO: 2.3 K/UL (ref 1–4.8)
LYMPHOCYTES NFR BLD: 38.3 % (ref 18–48)
MCH RBC QN AUTO: 32.5 PG (ref 27–31)
MCHC RBC AUTO-ENTMCNC: 30.9 G/DL (ref 32–36)
MCV RBC AUTO: 105 FL (ref 82–98)
MONOCYTES # BLD AUTO: 0.3 K/UL (ref 0.3–1)
MONOCYTES NFR BLD: 5.6 % (ref 4–15)
NEUTROPHILS # BLD AUTO: 3.3 K/UL (ref 1.8–7.7)
NEUTROPHILS NFR BLD: 55.5 % (ref 38–73)
NONHDLC SERPL-MCNC: 171 MG/DL
NRBC BLD-RTO: 0 /100 WBC
PLATELET # BLD AUTO: 203 K/UL (ref 150–350)
PMV BLD AUTO: 12 FL (ref 9.2–12.9)
POTASSIUM SERPL-SCNC: 3.4 MMOL/L (ref 3.5–5.1)
PROT SERPL-MCNC: 8.2 G/DL (ref 6–8.4)
RBC # BLD AUTO: 3.72 M/UL (ref 4–5.4)
SODIUM SERPL-SCNC: 140 MMOL/L (ref 136–145)
TRIGL SERPL-MCNC: 110 MG/DL (ref 30–150)
TSH SERPL DL<=0.005 MIU/L-ACNC: 2.26 UIU/ML (ref 0.4–4)
VIT B12 SERPL-MCNC: 595 PG/ML (ref 210–950)
WBC # BLD AUTO: 5.92 K/UL (ref 3.9–12.7)

## 2020-11-13 PROCEDURE — 99396 PREV VISIT EST AGE 40-64: CPT | Mod: 25,S$GLB,, | Performed by: STUDENT IN AN ORGANIZED HEALTH CARE EDUCATION/TRAINING PROGRAM

## 2020-11-13 PROCEDURE — 99999 PR PBB SHADOW E&M-EST. PATIENT-LVL IV: ICD-10-PCS | Mod: PBBFAC,,, | Performed by: STUDENT IN AN ORGANIZED HEALTH CARE EDUCATION/TRAINING PROGRAM

## 2020-11-13 PROCEDURE — 90686 FLU VACCINE (QUAD) GREATER THAN OR EQUAL TO 3YO PRESERVATIVE FREE IM: ICD-10-PCS | Mod: S$GLB,,, | Performed by: STUDENT IN AN ORGANIZED HEALTH CARE EDUCATION/TRAINING PROGRAM

## 2020-11-13 PROCEDURE — 86803 HEPATITIS C AB TEST: CPT

## 2020-11-13 PROCEDURE — 80053 COMPREHEN METABOLIC PANEL: CPT

## 2020-11-13 PROCEDURE — 84443 ASSAY THYROID STIM HORMONE: CPT

## 2020-11-13 PROCEDURE — 85025 COMPLETE CBC W/AUTO DIFF WBC: CPT

## 2020-11-13 PROCEDURE — 36415 COLL VENOUS BLD VENIPUNCTURE: CPT

## 2020-11-13 PROCEDURE — 80061 LIPID PANEL: CPT

## 2020-11-13 PROCEDURE — 90686 IIV4 VACC NO PRSV 0.5 ML IM: CPT | Mod: S$GLB,,, | Performed by: STUDENT IN AN ORGANIZED HEALTH CARE EDUCATION/TRAINING PROGRAM

## 2020-11-13 PROCEDURE — 99999 PR PBB SHADOW E&M-EST. PATIENT-LVL IV: CPT | Mod: PBBFAC,,, | Performed by: STUDENT IN AN ORGANIZED HEALTH CARE EDUCATION/TRAINING PROGRAM

## 2020-11-13 PROCEDURE — 83921 ORGANIC ACID SINGLE QUANT: CPT

## 2020-11-13 PROCEDURE — 90471 FLU VACCINE (QUAD) GREATER THAN OR EQUAL TO 3YO PRESERVATIVE FREE IM: ICD-10-PCS | Mod: S$GLB,,, | Performed by: STUDENT IN AN ORGANIZED HEALTH CARE EDUCATION/TRAINING PROGRAM

## 2020-11-13 PROCEDURE — 99396 PR PREVENTIVE VISIT,EST,40-64: ICD-10-PCS | Mod: 25,S$GLB,, | Performed by: STUDENT IN AN ORGANIZED HEALTH CARE EDUCATION/TRAINING PROGRAM

## 2020-11-13 PROCEDURE — 90471 IMMUNIZATION ADMIN: CPT | Mod: S$GLB,,, | Performed by: STUDENT IN AN ORGANIZED HEALTH CARE EDUCATION/TRAINING PROGRAM

## 2020-11-13 PROCEDURE — 82306 VITAMIN D 25 HYDROXY: CPT

## 2020-11-13 PROCEDURE — 82746 ASSAY OF FOLIC ACID SERUM: CPT

## 2020-11-13 PROCEDURE — 82607 VITAMIN B-12: CPT

## 2020-11-13 RX ORDER — DULOXETIN HYDROCHLORIDE 60 MG/1
60 CAPSULE, DELAYED RELEASE ORAL DAILY
Qty: 30 CAPSULE | Refills: 11 | Status: SHIPPED | OUTPATIENT
Start: 2020-11-13 | End: 2021-11-09 | Stop reason: SDUPTHER

## 2020-11-13 NOTE — PROGRESS NOTES
Reviewed patient's medical record in Epic. Patient's history and physical reviewed and discussed, please refer to resident physician's note for specific details. I agree with resident's assessment and plan.  Restarting cymbalta for anxiety/depression - did well on this previously. Follow up in 3-4 months to check in on this, earlier if needed / if any side effects.  Blu Mora MD

## 2020-11-13 NOTE — PROGRESS NOTES
"[facility]  Resident Clinic Note    Chief Complaint      Chief Complaint   Patient presents with    Annual Exam     History of Present Illness      Denisse Steen is a 45 y.o. female with chronic conditions of migraine, anxiety who presents today for: annual    Sometimes "depressed"  Fatigue, drained  Sleep Is erratic due to night shift  Low interest in activities   Sometimes feelings of guilt but no helplessness or hopelessness  Low energy  Concentration sometimes off at work with dealing with numbers  Appettie: good  No thoughts of self-harm    Anxiety  Initially doing well enough that came off cymbalta  Since covid, has recurred  Tries to temper anxiety with walking, medication, gospel music, talking to mom on phone  However, occasionally, she will have panic attacks    Work:   Taking COVID precautions by masking, social distancing, and hand hygiene     Weight increased compared to last year  10# "covid" weight  Diet is off - lot's of junk food and etoh use; 2-3 drinks everyday; now drinking less  Activity level also less than usual    Migraines  Every now and then; about 1x monthly  Controlled with otc nsaids  Cccassionally takes goodies powder    Constipation  BMs slower than usual  Notes poor water and fiber intake in diet     Past Medical History:  Past Medical History:   Diagnosis Date    Abnormal Pap smear     Anemia     Anxiety 7/18/2013    Blood transfusion 2008       Past Surgical History:   has a past surgical history that includes Cervical biopsy w/ loop electrode excision; Dilation and curettage of uterus; Ovarian cyst removal; Tonsillectomy; Adenoidectomy; [place] tooth extraction; and Hysterectomy (02/20/2018).    Family History:  family history includes Cancer in her father and sister; Diabetes in her mother; Uterine cancer in her mother.     Social History:  Social History     Tobacco Use    Smoking status: Never Smoker    Smokeless tobacco: Never Used   Substance " Use Topics    Alcohol use: Yes     Frequency: 4 or more times a week     Drinks per session: 1 or 2     Binge frequency: Weekly     Comment: socially    Drug use: No       I personally reviewed all past medical, surgical, social and family history.    Review of Systems   HENT: Negative for hearing loss.    Eyes: Negative for discharge.   Respiratory: Negative for wheezing.    Cardiovascular: Negative for chest pain and palpitations.   Gastrointestinal: Negative for blood in stool, constipation, diarrhea and vomiting.   Genitourinary: Negative for dysuria and hematuria.   Musculoskeletal: Negative for neck pain.   Neurological: Positive for headaches. Negative for weakness.   Endo/Heme/Allergies: Negative for polydipsia.      Answers for HPI/ROS submitted by the patient on 11/10/2020   activity change: No  unexpected weight change: No  rhinorrhea: No  trouble swallowing: No  visual disturbance: No  chest tightness: No  polyuria: No  difficulty urinating: No  menstrual problem: No  joint swelling: No  arthralgias: No  confusion: Yes  dysphoric mood: Yes    Medications:  Outpatient Encounter Medications as of 11/13/2020   Medication Sig Dispense Refill    DULoxetine (CYMBALTA) 60 MG capsule Take 1 capsule (60 mg total) by mouth once daily. 30 capsule 11    [DISCONTINUED] DULoxetine (CYMBALTA) 30 MG capsule Take 1 capsule (30 mg total) by mouth once daily. 90 capsule 3     No facility-administered encounter medications on file as of 11/13/2020.        Allergies:  Review of patient's allergies indicates:  No Known Allergies    Health Maintenance:  Immunization History   Administered Date(s) Administered    Influenza - Quadrivalent - PF *Preferred* (6 months and older) 10/11/2019    PPD Test 03/22/2005, 03/24/2005    Tdap 07/25/2006      Health Maintenance   Topic Date Due    Hepatitis C Screening  1975    TETANUS VACCINE  07/25/2016    Mammogram  10/16/2020    Lipid Panel  Completed        Physical Exam  "     Vital Signs  Pulse: (!) 59  SpO2: 99 %  BP: 120/80  BP Location: Left arm  Patient Position: Sitting  Pain Score: 0-No pain  Height and Weight  Height: 5' 6" (167.6 cm)  Weight: 77.5 kg (170 lb 13.7 oz)  BSA (Calculated - sq m): 1.9 sq meters  BMI (Calculated): 27.6  Weight in (lb) to have BMI = 25: 154.6]    Physical Exam  Vitals signs and nursing note reviewed.   Constitutional:       General: She is not in acute distress.     Appearance: She is not diaphoretic.   HENT:      Head: Normocephalic and atraumatic.      Right Ear: Tympanic membrane, ear canal and external ear normal.      Left Ear: Tympanic membrane, ear canal and external ear normal.   Eyes:      General: No scleral icterus.     Pupils: Pupils are equal, round, and reactive to light.   Neck:      Musculoskeletal: Normal range of motion and neck supple.      Thyroid: No thyromegaly.   Cardiovascular:      Rate and Rhythm: Normal rate and regular rhythm.      Heart sounds: Normal heart sounds. No murmur. No friction rub. No gallop.    Pulmonary:      Effort: Pulmonary effort is normal.      Breath sounds: Normal breath sounds. No wheezing or rales.   Abdominal:      General: Bowel sounds are normal.      Palpations: Abdomen is soft.      Tenderness: There is no abdominal tenderness. There is no guarding.   Musculoskeletal:      Right lower leg: No edema.      Left lower leg: No edema.   Lymphadenopathy:      Cervical: No cervical adenopathy.   Skin:     General: Skin is warm and dry.   Neurological:      Mental Status: She is alert and oriented to person, place, and time.   Psychiatric:         Speech: Speech normal.          Laboratory:  CBC:  Recent Labs   Lab 02/16/18  1510 07/31/18  1043 10/11/19  1147   WBC 4.69 4.28 4.30   RBC 3.71 L 3.54 L 3.63 L   Hemoglobin 12.1 11.3 L 11.8 L   Hematocrit 36.8 L 35.2 L 35.9 L   Platelets 214 191 228   MCV 99 H 99 H 99 H   MCH 32.6 H 31.9 H 32.5 H   MCHC 32.9 32.1 32.9     CMP:  Recent Labs   Lab " 07/31/18  1043 10/11/19  1147   Glucose 96 83   Calcium 9.1 9.0   Albumin 3.9 4.3   Total Protein 7.6 8.0   Sodium 139 141   Potassium 3.6 4.1   CO2 24 26   Chloride 110 105   BUN 10 11   Alkaline Phosphatase 42 L 32 L   ALT 19 17   AST 15 14   Total Bilirubin 0.5 0.4     URINALYSIS:  Recent Labs   Lab 04/29/19  1210   Color, UA yellow   Spec Grav UA 1.000   pH, UA 7   Nitrite, UA neg   Urobilinogen, UA normal      LIPIDS:  Recent Labs   Lab 07/31/18  1043 10/11/19  1147   TSH 1.145  --    HDL 50 57   Cholesterol 172 219 H   Triglycerides 120 63   LDL Cholesterol 98.0 149.4   HDL/Cholesterol Ratio 29.1 26.0   Non-HDL Cholesterol 122 162   Total Cholesterol/HDL Ratio 3.4 3.8     TSH:  Recent Labs   Lab 07/31/18  1043   TSH 1.145     A1C:  Recent Labs   Lab 07/31/18  1043 10/11/19  1147   Hemoglobin A1C 5.1 5.2       Assessment/Plan     Denisse Steen is a 45 y.o.female with:    1. Anxiety and depression  Re-start duloxetine. Will start at 60mg for anxiety. No issues w/ 30mg previously. Advised to notify if any intolerance.  - DULoxetine (CYMBALTA) 60 MG capsule; Take 1 capsule (60 mg total) by mouth once daily.  Dispense: 30 capsule; Refill: 11    2. Healthcare maintenance  Flu shot today. Tetanus at follow-up.  - CBC Auto Differential; Future  - Mammo Digital Screening Bilat; Future  - Hepatitis C Antibody; Future    3. Low energy  R/o organic etiology; likely due to mood d/o.  - TSH; Future  - Comprehensive Metabolic Panel; Future  - Folate; Future  - Methylmalonic Acid, Serum; Future  - Vitamin D; Future    4. Anemia, unspecified type  R/o organic etiology of low energy. Prior macrocytic anemia.   - CBC Auto Differential; Future  - Vitamin B12; Future  - Methylmalonic Acid, Serum; Future    5. Overweight  Counseled regarding lifestyle factors. Reduce etoh intake.  - Comprehensive Metabolic Panel; Future  - Lipid Panel; Future  - Vitamin D; Future       Chronic conditions status updated as per HPI.  Other  than changes above, cont current medications. Return to clinic in 3-4 months.      Shruti Momin MD  PGY3  Internal Medicine

## 2020-11-16 ENCOUNTER — PATIENT MESSAGE (OUTPATIENT)
Dept: INTERNAL MEDICINE | Facility: CLINIC | Age: 45
End: 2020-11-16

## 2020-11-16 LAB — HCV AB SERPL QL IA: NEGATIVE

## 2020-11-17 ENCOUNTER — TELEPHONE (OUTPATIENT)
Dept: INTERNAL MEDICINE | Facility: CLINIC | Age: 45
End: 2020-11-17

## 2020-11-17 DIAGNOSIS — E55.9 VITAMIN D INSUFFICIENCY: Primary | ICD-10-CM

## 2020-11-17 LAB — METHYLMALONATE SERPL-SCNC: 0.1 UMOL/L

## 2020-11-17 RX ORDER — VIT C/E/ZN/COPPR/LUTEIN/ZEAXAN 250MG-90MG
1000 CAPSULE ORAL DAILY
Refills: 0
Start: 2020-11-17 | End: 2021-02-15

## 2020-11-24 ENCOUNTER — HOSPITAL ENCOUNTER (OUTPATIENT)
Dept: RADIOLOGY | Facility: HOSPITAL | Age: 45
Discharge: HOME | End: 2020-11-24
Attending: STUDENT IN AN ORGANIZED HEALTH CARE EDUCATION/TRAINING PROGRAM
Payer: COMMERCIAL

## 2020-11-24 VITALS — WEIGHT: 169.75 LBS | BODY MASS INDEX: 27.28 KG/M2 | HEIGHT: 66 IN

## 2020-11-24 DIAGNOSIS — Z00.00 ANNUAL PHYSICAL EXAM: ICD-10-CM

## 2020-11-24 DIAGNOSIS — Z12.31 BREAST CANCER SCREENING BY MAMMOGRAM: ICD-10-CM

## 2020-11-24 PROCEDURE — 77063 BREAST TOMOSYNTHESIS BI: CPT | Mod: 26,,, | Performed by: RADIOLOGY

## 2020-11-24 PROCEDURE — 77067 SCR MAMMO BI INCL CAD: CPT | Mod: 26,,, | Performed by: RADIOLOGY

## 2020-11-24 PROCEDURE — 77067 MAMMO DIGITAL SCREENING BILAT WITH TOMO: ICD-10-PCS | Mod: 26,,, | Performed by: RADIOLOGY

## 2020-11-24 PROCEDURE — 77063 MAMMO DIGITAL SCREENING BILAT WITH TOMO: ICD-10-PCS | Mod: 26,,, | Performed by: RADIOLOGY

## 2020-11-24 PROCEDURE — 77067 SCR MAMMO BI INCL CAD: CPT | Mod: TC

## 2021-11-09 ENCOUNTER — LAB VISIT (OUTPATIENT)
Dept: LAB | Facility: HOSPITAL | Age: 46
End: 2021-11-09
Payer: COMMERCIAL

## 2021-11-09 ENCOUNTER — OFFICE VISIT (OUTPATIENT)
Dept: INTERNAL MEDICINE | Facility: CLINIC | Age: 46
End: 2021-11-09
Payer: COMMERCIAL

## 2021-11-09 ENCOUNTER — IMMUNIZATION (OUTPATIENT)
Dept: INTERNAL MEDICINE | Facility: CLINIC | Age: 46
End: 2021-11-09
Payer: COMMERCIAL

## 2021-11-09 VITALS
HEART RATE: 72 BPM | HEIGHT: 66 IN | SYSTOLIC BLOOD PRESSURE: 130 MMHG | WEIGHT: 169 LBS | DIASTOLIC BLOOD PRESSURE: 86 MMHG | BODY MASS INDEX: 27.16 KG/M2 | OXYGEN SATURATION: 99 %

## 2021-11-09 DIAGNOSIS — Z23 NEED FOR VACCINATION: ICD-10-CM

## 2021-11-09 DIAGNOSIS — Z12.31 ENCOUNTER FOR SCREENING MAMMOGRAM FOR MALIGNANT NEOPLASM OF BREAST: ICD-10-CM

## 2021-11-09 DIAGNOSIS — E66.3 OVERWEIGHT: ICD-10-CM

## 2021-11-09 DIAGNOSIS — F41.9 ANXIETY: ICD-10-CM

## 2021-11-09 DIAGNOSIS — G43.109 MIGRAINE WITH AURA AND WITHOUT STATUS MIGRAINOSUS, NOT INTRACTABLE: ICD-10-CM

## 2021-11-09 DIAGNOSIS — Z00.00 VISIT FOR ANNUAL HEALTH EXAMINATION: ICD-10-CM

## 2021-11-09 DIAGNOSIS — Z00.00 VISIT FOR ANNUAL HEALTH EXAMINATION: Primary | ICD-10-CM

## 2021-11-09 PROBLEM — R07.1 CHEST PAIN ON BREATHING: Status: RESOLVED | Noted: 2018-07-31 | Resolved: 2021-11-09

## 2021-11-09 PROBLEM — N92.0 MENORRHAGIA: Status: RESOLVED | Noted: 2018-02-20 | Resolved: 2021-11-09

## 2021-11-09 PROBLEM — R35.0 URINARY FREQUENCY: Status: RESOLVED | Noted: 2018-08-14 | Resolved: 2021-11-09

## 2021-11-09 LAB
ALBUMIN SERPL BCP-MCNC: 4 G/DL (ref 3.5–5.2)
ALP SERPL-CCNC: 38 U/L (ref 55–135)
ALT SERPL W/O P-5'-P-CCNC: 16 U/L (ref 10–44)
ANION GAP SERPL CALC-SCNC: 9 MMOL/L (ref 8–16)
AST SERPL-CCNC: 18 U/L (ref 10–40)
BASOPHILS # BLD AUTO: 0.02 K/UL (ref 0–0.2)
BASOPHILS NFR BLD: 0.4 % (ref 0–1.9)
BILIRUB SERPL-MCNC: 0.6 MG/DL (ref 0.1–1)
BUN SERPL-MCNC: 13 MG/DL (ref 6–20)
CALCIUM SERPL-MCNC: 9.4 MG/DL (ref 8.7–10.5)
CHLORIDE SERPL-SCNC: 105 MMOL/L (ref 95–110)
CHOLEST SERPL-MCNC: 233 MG/DL (ref 120–199)
CHOLEST/HDLC SERPL: 3.6 {RATIO} (ref 2–5)
CO2 SERPL-SCNC: 25 MMOL/L (ref 23–29)
CREAT SERPL-MCNC: 0.9 MG/DL (ref 0.5–1.4)
DIFFERENTIAL METHOD: ABNORMAL
EOSINOPHIL # BLD AUTO: 0 K/UL (ref 0–0.5)
EOSINOPHIL NFR BLD: 0.5 % (ref 0–8)
ERYTHROCYTE [DISTWIDTH] IN BLOOD BY AUTOMATED COUNT: 12.8 % (ref 11.5–14.5)
EST. GFR  (AFRICAN AMERICAN): >60 ML/MIN/1.73 M^2
EST. GFR  (NON AFRICAN AMERICAN): >60 ML/MIN/1.73 M^2
ESTIMATED AVG GLUCOSE: 105 MG/DL (ref 68–131)
GLUCOSE SERPL-MCNC: 92 MG/DL (ref 70–110)
HBA1C MFR BLD: 5.3 % (ref 4–5.6)
HCT VFR BLD AUTO: 36.8 % (ref 37–48.5)
HDLC SERPL-MCNC: 65 MG/DL (ref 40–75)
HDLC SERPL: 27.9 % (ref 20–50)
HGB BLD-MCNC: 11.6 G/DL (ref 12–16)
IMM GRANULOCYTES # BLD AUTO: 0.01 K/UL (ref 0–0.04)
IMM GRANULOCYTES NFR BLD AUTO: 0.2 % (ref 0–0.5)
LDLC SERPL CALC-MCNC: 146.6 MG/DL (ref 63–159)
LYMPHOCYTES # BLD AUTO: 2 K/UL (ref 1–4.8)
LYMPHOCYTES NFR BLD: 36.3 % (ref 18–48)
MCH RBC QN AUTO: 31.9 PG (ref 27–31)
MCHC RBC AUTO-ENTMCNC: 31.5 G/DL (ref 32–36)
MCV RBC AUTO: 101 FL (ref 82–98)
MONOCYTES # BLD AUTO: 0.4 K/UL (ref 0.3–1)
MONOCYTES NFR BLD: 6.9 % (ref 4–15)
NEUTROPHILS # BLD AUTO: 3.1 K/UL (ref 1.8–7.7)
NEUTROPHILS NFR BLD: 55.7 % (ref 38–73)
NONHDLC SERPL-MCNC: 168 MG/DL
NRBC BLD-RTO: 0 /100 WBC
PLATELET # BLD AUTO: 201 K/UL (ref 150–450)
PMV BLD AUTO: 11.2 FL (ref 9.2–12.9)
POTASSIUM SERPL-SCNC: 3.6 MMOL/L (ref 3.5–5.1)
PROT SERPL-MCNC: 7.7 G/DL (ref 6–8.4)
RBC # BLD AUTO: 3.64 M/UL (ref 4–5.4)
SODIUM SERPL-SCNC: 139 MMOL/L (ref 136–145)
TRIGL SERPL-MCNC: 107 MG/DL (ref 30–150)
TSH SERPL DL<=0.005 MIU/L-ACNC: 1.58 UIU/ML (ref 0.4–4)
WBC # BLD AUTO: 5.62 K/UL (ref 3.9–12.7)

## 2021-11-09 PROCEDURE — 99999 PR PBB SHADOW E&M-EST. PATIENT-LVL IV: ICD-10-PCS | Mod: PBBFAC,,, | Performed by: INTERNAL MEDICINE

## 2021-11-09 PROCEDURE — 1159F PR MEDICATION LIST DOCUMENTED IN MEDICAL RECORD: ICD-10-PCS | Mod: CPTII,S$GLB,, | Performed by: INTERNAL MEDICINE

## 2021-11-09 PROCEDURE — 80061 LIPID PANEL: CPT | Performed by: INTERNAL MEDICINE

## 2021-11-09 PROCEDURE — 90686 IIV4 VACC NO PRSV 0.5 ML IM: CPT | Mod: S$GLB,,, | Performed by: INTERNAL MEDICINE

## 2021-11-09 PROCEDURE — 36415 COLL VENOUS BLD VENIPUNCTURE: CPT | Performed by: INTERNAL MEDICINE

## 2021-11-09 PROCEDURE — 3079F DIAST BP 80-89 MM HG: CPT | Mod: CPTII,S$GLB,, | Performed by: INTERNAL MEDICINE

## 2021-11-09 PROCEDURE — 3008F PR BODY MASS INDEX (BMI) DOCUMENTED: ICD-10-PCS | Mod: CPTII,S$GLB,, | Performed by: INTERNAL MEDICINE

## 2021-11-09 PROCEDURE — 80053 COMPREHEN METABOLIC PANEL: CPT | Performed by: INTERNAL MEDICINE

## 2021-11-09 PROCEDURE — 3079F PR MOST RECENT DIASTOLIC BLOOD PRESSURE 80-89 MM HG: ICD-10-PCS | Mod: CPTII,S$GLB,, | Performed by: INTERNAL MEDICINE

## 2021-11-09 PROCEDURE — 3075F SYST BP GE 130 - 139MM HG: CPT | Mod: CPTII,S$GLB,, | Performed by: INTERNAL MEDICINE

## 2021-11-09 PROCEDURE — 1159F MED LIST DOCD IN RCRD: CPT | Mod: CPTII,S$GLB,, | Performed by: INTERNAL MEDICINE

## 2021-11-09 PROCEDURE — 85025 COMPLETE CBC W/AUTO DIFF WBC: CPT | Performed by: INTERNAL MEDICINE

## 2021-11-09 PROCEDURE — 3008F BODY MASS INDEX DOCD: CPT | Mod: CPTII,S$GLB,, | Performed by: INTERNAL MEDICINE

## 2021-11-09 PROCEDURE — 99999 PR PBB SHADOW E&M-EST. PATIENT-LVL IV: CPT | Mod: PBBFAC,,, | Performed by: INTERNAL MEDICINE

## 2021-11-09 PROCEDURE — 90686 FLU VACCINE (QUAD) GREATER THAN OR EQUAL TO 3YO PRESERVATIVE FREE IM: ICD-10-PCS | Mod: S$GLB,,, | Performed by: INTERNAL MEDICINE

## 2021-11-09 PROCEDURE — 90471 IMMUNIZATION ADMIN: CPT | Mod: S$GLB,,, | Performed by: INTERNAL MEDICINE

## 2021-11-09 PROCEDURE — 99396 PR PREVENTIVE VISIT,EST,40-64: ICD-10-PCS | Mod: 25,S$GLB,, | Performed by: INTERNAL MEDICINE

## 2021-11-09 PROCEDURE — 83036 HEMOGLOBIN GLYCOSYLATED A1C: CPT | Performed by: INTERNAL MEDICINE

## 2021-11-09 PROCEDURE — 84443 ASSAY THYROID STIM HORMONE: CPT | Performed by: INTERNAL MEDICINE

## 2021-11-09 PROCEDURE — 3075F PR MOST RECENT SYSTOLIC BLOOD PRESS GE 130-139MM HG: ICD-10-PCS | Mod: CPTII,S$GLB,, | Performed by: INTERNAL MEDICINE

## 2021-11-09 PROCEDURE — 90471 FLU VACCINE (QUAD) GREATER THAN OR EQUAL TO 3YO PRESERVATIVE FREE IM: ICD-10-PCS | Mod: S$GLB,,, | Performed by: INTERNAL MEDICINE

## 2021-11-09 PROCEDURE — 99396 PREV VISIT EST AGE 40-64: CPT | Mod: 25,S$GLB,, | Performed by: INTERNAL MEDICINE

## 2021-11-09 RX ORDER — CHOLECALCIFEROL (VITAMIN D3) 25 MCG
1000 TABLET ORAL DAILY
COMMUNITY

## 2021-11-09 RX ORDER — DULOXETIN HYDROCHLORIDE 60 MG/1
60 CAPSULE, DELAYED RELEASE ORAL DAILY
Qty: 30 CAPSULE | Refills: 11 | Status: SHIPPED | OUTPATIENT
Start: 2021-11-09 | End: 2023-09-20

## 2021-12-17 ENCOUNTER — HOSPITAL ENCOUNTER (OUTPATIENT)
Dept: RADIOLOGY | Facility: HOSPITAL | Age: 46
Discharge: HOME | End: 2021-12-17
Attending: INTERNAL MEDICINE
Payer: COMMERCIAL

## 2021-12-17 VITALS — BODY MASS INDEX: 27.28 KG/M2 | HEIGHT: 66 IN

## 2021-12-17 DIAGNOSIS — Z12.31 ENCOUNTER FOR SCREENING MAMMOGRAM FOR MALIGNANT NEOPLASM OF BREAST: ICD-10-CM

## 2021-12-17 PROCEDURE — 77063 MAMMO DIGITAL SCREENING BILAT WITH TOMO: ICD-10-PCS | Mod: 26,,, | Performed by: RADIOLOGY

## 2021-12-17 PROCEDURE — 77063 BREAST TOMOSYNTHESIS BI: CPT | Mod: 26,,, | Performed by: RADIOLOGY

## 2021-12-17 PROCEDURE — 77067 SCR MAMMO BI INCL CAD: CPT | Mod: 26,,, | Performed by: RADIOLOGY

## 2021-12-17 PROCEDURE — 77067 MAMMO DIGITAL SCREENING BILAT WITH TOMO: ICD-10-PCS | Mod: 26,,, | Performed by: RADIOLOGY

## 2021-12-17 PROCEDURE — 77067 SCR MAMMO BI INCL CAD: CPT | Mod: TC

## 2022-03-18 ENCOUNTER — PATIENT MESSAGE (OUTPATIENT)
Dept: ADMINISTRATIVE | Facility: HOSPITAL | Age: 47
End: 2022-03-18
Payer: COMMERCIAL

## 2022-03-20 DIAGNOSIS — Z12.11 SCREENING FOR COLON CANCER: ICD-10-CM

## 2022-04-05 LAB — HEMOCCULT STL QL IA: NEGATIVE

## 2022-04-12 ENCOUNTER — TELEPHONE (OUTPATIENT)
Dept: INTERNAL MEDICINE | Facility: CLINIC | Age: 47
End: 2022-04-12
Payer: COMMERCIAL

## 2022-04-13 ENCOUNTER — PATIENT MESSAGE (OUTPATIENT)
Dept: INTERNAL MEDICINE | Facility: CLINIC | Age: 47
End: 2022-04-13
Payer: COMMERCIAL

## 2022-06-13 DIAGNOSIS — R07.9 CHEST PAIN, UNSPECIFIED TYPE: Primary | ICD-10-CM

## 2022-06-15 ENCOUNTER — OFFICE VISIT (OUTPATIENT)
Dept: CARDIOLOGY | Facility: CLINIC | Age: 47
End: 2022-06-15
Payer: COMMERCIAL

## 2022-06-15 ENCOUNTER — HOSPITAL ENCOUNTER (OUTPATIENT)
Dept: CARDIOLOGY | Facility: CLINIC | Age: 47
Discharge: HOME | End: 2022-06-15
Payer: COMMERCIAL

## 2022-06-15 VITALS
HEIGHT: 66 IN | DIASTOLIC BLOOD PRESSURE: 94 MMHG | WEIGHT: 170.19 LBS | SYSTOLIC BLOOD PRESSURE: 140 MMHG | BODY MASS INDEX: 27.35 KG/M2

## 2022-06-15 DIAGNOSIS — R07.89 ATYPICAL CHEST PAIN: Primary | ICD-10-CM

## 2022-06-15 DIAGNOSIS — R03.0 ELEVATED BLOOD PRESSURE READING WITHOUT DIAGNOSIS OF HYPERTENSION: ICD-10-CM

## 2022-06-15 DIAGNOSIS — F41.9 ANXIETY: ICD-10-CM

## 2022-06-15 DIAGNOSIS — R07.9 CHEST PAIN, UNSPECIFIED TYPE: ICD-10-CM

## 2022-06-15 PROCEDURE — 3008F PR BODY MASS INDEX (BMI) DOCUMENTED: ICD-10-PCS | Mod: CPTII,S$GLB,, | Performed by: INTERNAL MEDICINE

## 2022-06-15 PROCEDURE — 93000 ELECTROCARDIOGRAM COMPLETE: CPT | Mod: S$GLB,,, | Performed by: INTERNAL MEDICINE

## 2022-06-15 PROCEDURE — 3080F PR MOST RECENT DIASTOLIC BLOOD PRESSURE >= 90 MM HG: ICD-10-PCS | Mod: CPTII,S$GLB,, | Performed by: INTERNAL MEDICINE

## 2022-06-15 PROCEDURE — 1159F PR MEDICATION LIST DOCUMENTED IN MEDICAL RECORD: ICD-10-PCS | Mod: CPTII,S$GLB,, | Performed by: INTERNAL MEDICINE

## 2022-06-15 PROCEDURE — 3077F SYST BP >= 140 MM HG: CPT | Mod: CPTII,S$GLB,, | Performed by: INTERNAL MEDICINE

## 2022-06-15 PROCEDURE — 99214 PR OFFICE/OUTPT VISIT, EST, LEVL IV, 30-39 MIN: ICD-10-PCS | Mod: 25,S$GLB,, | Performed by: INTERNAL MEDICINE

## 2022-06-15 PROCEDURE — 99214 OFFICE O/P EST MOD 30 MIN: CPT | Mod: 25,S$GLB,, | Performed by: INTERNAL MEDICINE

## 2022-06-15 PROCEDURE — 3080F DIAST BP >= 90 MM HG: CPT | Mod: CPTII,S$GLB,, | Performed by: INTERNAL MEDICINE

## 2022-06-15 PROCEDURE — 99999 PR PBB SHADOW E&M-EST. PATIENT-LVL III: CPT | Mod: PBBFAC,,, | Performed by: INTERNAL MEDICINE

## 2022-06-15 PROCEDURE — 3077F PR MOST RECENT SYSTOLIC BLOOD PRESSURE >= 140 MM HG: ICD-10-PCS | Mod: CPTII,S$GLB,, | Performed by: INTERNAL MEDICINE

## 2022-06-15 PROCEDURE — 93000 EKG 12-LEAD: ICD-10-PCS | Mod: S$GLB,,, | Performed by: INTERNAL MEDICINE

## 2022-06-15 PROCEDURE — 1159F MED LIST DOCD IN RCRD: CPT | Mod: CPTII,S$GLB,, | Performed by: INTERNAL MEDICINE

## 2022-06-15 PROCEDURE — 99999 PR PBB SHADOW E&M-EST. PATIENT-LVL III: ICD-10-PCS | Mod: PBBFAC,,, | Performed by: INTERNAL MEDICINE

## 2022-06-15 PROCEDURE — 3008F BODY MASS INDEX DOCD: CPT | Mod: CPTII,S$GLB,, | Performed by: INTERNAL MEDICINE

## 2022-06-15 NOTE — PROGRESS NOTES
Subjective:   Patient ID:  Denisse Steen is a 47 y.o. female who presents for follow up of No chief complaint on file.      HPI: Very pleasant woman with no known cardiovascular disease presents because of chest discomfort.  A couple of weeks ago, she woke up with a pins and needles sensation in her chest followed by a pressure-like discomfort both in the center of her chest and in her left chest.  This persisted for days, unchanged by exertion, but exacerbated by arm and chest wall stretching.    She's also worried about her blood pressure being a bit more elevated than it used to be.    She denies further chest discomfort, SIMON, palpitations, PND/orthopnea, lightheadedness and syncope.      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 Patient c/o substernal chest pain for 1 week out of the month, radiating to the back. Sharp discomfort 5 - 6 / 10, last ~ 1 hour. Alleviated with resting. Associated with lightheadedness, dizziness, SOB and palpitations. Denies Syncope, no history of PE/DVT. Has had chest pain while working at the [employer]. Symptoms off /on for couple months. She reports anxiety / depression has been stable and pleased with the results. She used work out with sit ups / Squats 3 -4x weekly, stopped doing this because of the holidays, never had chest pain with her work outs.      No family history of MI/CAD, mother with HF medication induced. Patient completed a EDNA for work purposes (2018), reportedly it was negative, not complete at [facility].      A1c 5.2  Cr 0.9   Lipid:  / TG 63 / LDL 63 / HDL 57  EKG 2012: NSR, no acute ST changes   No TTE on  file  ============================================================================================================    Patient Active Problem List   Diagnosis    Anxiety    S/P laparoscopic hysterectomy    Migraine with aura and without status migrainosus, not intractable    Memory difficulties       Current Outpatient Medications   Medication Sig    DULoxetine (CYMBALTA) 60 MG capsule Take 1 capsule (60 mg total) by mouth once daily.    vitamin D (VITAMIN D3) 1000 units Tab Take 1,000 Units by mouth once daily.     No current facility-administered medications for this visit.       Review of Systems   Constitutional: Negative.   HENT: Negative.    Eyes: Negative.    Cardiovascular: Positive for chest pain. Negative for dyspnea on exertion, near-syncope, orthopnea and palpitations.   Respiratory: Negative.  Negative for cough and shortness of breath.    Endocrine: Negative.    Hematologic/Lymphatic: Negative.    Skin: Negative.    Musculoskeletal: Negative.    Gastrointestinal: Negative.    Genitourinary: Negative.    Neurological: Negative.    Psychiatric/Behavioral: Negative.      Objective:   Physical Exam  Vitals reviewed.   Constitutional:       Appearance: She is well-developed.   HENT:      Head: Normocephalic and atraumatic.   Eyes:      General: No scleral icterus.     Conjunctiva/sclera: Conjunctivae normal.   Neck:      Vascular: No JVD.   Cardiovascular:      Rate and Rhythm: Normal rate and regular rhythm.      Pulses: Intact distal pulses.      Heart sounds: Normal heart sounds. No murmur heard.    No friction rub. No gallop.   Pulmonary:      Effort: Pulmonary effort is normal.      Breath sounds: Normal breath sounds. No wheezing or rales.   Abdominal:      General: Bowel sounds are normal. There is no distension.      Palpations: Abdomen is soft.      Tenderness: There is no abdominal tenderness.   Musculoskeletal:         General: Normal range of motion.      Cervical back: Normal range of  motion and neck supple.   Skin:     General: Skin is warm and dry.      Findings: No erythema or rash.   Neurological:      Mental Status: She is alert and oriented to person, place, and time.   Psychiatric:         Behavior: Behavior normal.         Thought Content: Thought content normal.         Judgment: Judgment normal.         Lab Results   Component Value Date    WBC 5.62 11/09/2021    HGB 11.6 (L) 11/09/2021    HCT 36.8 (L) 11/09/2021     (H) 11/09/2021     11/09/2021         Chemistry        Component Value Date/Time     11/09/2021 1220    K 3.6 11/09/2021 1220     11/09/2021 1220    CO2 25 11/09/2021 1220    BUN 13 11/09/2021 1220    CREATININE 0.9 11/09/2021 1220    GLU 92 11/09/2021 1220        Component Value Date/Time    CALCIUM 9.4 11/09/2021 1220    ALKPHOS 38 (L) 11/09/2021 1220    AST 18 11/09/2021 1220    ALT 16 11/09/2021 1220    BILITOT 0.6 11/09/2021 1220    ESTGFRAFRICA >60.0 11/09/2021 1220    EGFRNONAA >60.0 11/09/2021 1220            Lab Results   Component Value Date    CHOL 233 (H) 11/09/2021    CHOL 241 (H) 11/13/2020    CHOL 219 (H) 10/11/2019     Lab Results   Component Value Date    HDL 65 11/09/2021    HDL 70 11/13/2020    HDL 57 10/11/2019     Lab Results   Component Value Date    LDLCALC 146.6 11/09/2021    LDLCALC 149.0 11/13/2020    LDLCALC 149.4 10/11/2019     Lab Results   Component Value Date    TRIG 107 11/09/2021    TRIG 110 11/13/2020    TRIG 63 10/11/2019     Lab Results   Component Value Date    CHOLHDL 27.9 11/09/2021    CHOLHDL 29.0 11/13/2020    CHOLHDL 26.0 10/11/2019       Lab Results   Component Value Date    TSH 1.576 11/09/2021       Lab Results   Component Value Date    HGBA1C 5.3 11/09/2021       Assessment:     1. Atypical chest pain    2. Anxiety    3. Elevated blood pressure reading without diagnosis of hypertension        Plan:     Reassurance.  This is definitely not angina.    Home BP monitoring.  I suspect this is not true HTN.           Diet/exercise goals reinforced.      F/U PRN

## 2023-01-31 NOTE — TELEPHONE ENCOUNTER
Attempted to contact the pt to inform her that her letter is available for pick-up. No answer, left VM message for the pt to call the clinic back.   Cosentyx Counseling:  I discussed with the patient the risks of Cosentyx including but not limited to worsening of Crohn's disease, immunosuppression, allergic reactions and infections.  The patient understands that monitoring is required including a PPD at baseline and must alert us or the primary physician if symptoms of infection or other concerning signs are noted.

## 2023-05-30 NOTE — TELEPHONE ENCOUNTER
----- Message from Jimmie Lopez sent at 12/20/2017 11:17 AM CST -----  Contact: patient  x_ 1st Request   _ 2nd Request   _ 3rd Request     Who: CHATA GONZALEZ [[id number]]    Why: Pt missed your call is requesting a call back    What Number to Call Back: [phone]    When to Expect a call back: (Before the end of the day)   -- if call after 3:00 call back will be tomorrow.     [FreeTextEntry1] : HCM: last mammogram in 11/22. Due for DEXA scan and colonoscopy, discussed FIT testing. Recommended Shingrix and PNA vaccines she will consider. Check routine labs today. \par \par Elevated rheumatoid factor: complains of joint pains, will check RF level today \par \par Localized swelling at base of neck near manubrium: check US head and neck \par \par NAFLD: follows with hepatology. Had fibroscan done earlier this month. Check labs

## 2023-09-19 ENCOUNTER — TELEPHONE (OUTPATIENT)
Dept: NEUROLOGY | Facility: CLINIC | Age: 48
End: 2023-09-19
Payer: MEDICAID

## 2023-09-20 ENCOUNTER — OFFICE VISIT (OUTPATIENT)
Dept: NEUROLOGY | Facility: CLINIC | Age: 48
End: 2023-09-20
Payer: MEDICAID

## 2023-09-20 VITALS
OXYGEN SATURATION: 98 % | DIASTOLIC BLOOD PRESSURE: 71 MMHG | BODY MASS INDEX: 27 KG/M2 | HEIGHT: 66 IN | HEART RATE: 76 BPM | WEIGHT: 168 LBS | SYSTOLIC BLOOD PRESSURE: 119 MMHG

## 2023-09-20 DIAGNOSIS — R41.3 MEMORY DIFFICULTIES: ICD-10-CM

## 2023-09-20 DIAGNOSIS — G43.109 MIGRAINE WITH AURA AND WITHOUT STATUS MIGRAINOSUS, NOT INTRACTABLE: Primary | ICD-10-CM

## 2023-09-20 PROCEDURE — 99999 PR PBB SHADOW E&M-EST. PATIENT-LVL III: CPT | Mod: PBBFAC,,, | Performed by: INTERNAL MEDICINE

## 2023-09-20 PROCEDURE — 99204 OFFICE O/P NEW MOD 45 MIN: CPT | Mod: S$PBB,,, | Performed by: INTERNAL MEDICINE

## 2023-09-20 PROCEDURE — 3074F SYST BP LT 130 MM HG: CPT | Mod: CPTII,,, | Performed by: INTERNAL MEDICINE

## 2023-09-20 PROCEDURE — 3008F BODY MASS INDEX DOCD: CPT | Mod: CPTII,,, | Performed by: INTERNAL MEDICINE

## 2023-09-20 PROCEDURE — 99204 PR OFFICE/OUTPT VISIT, NEW, LEVL IV, 45-59 MIN: ICD-10-PCS | Mod: S$PBB,,, | Performed by: INTERNAL MEDICINE

## 2023-09-20 PROCEDURE — 3078F DIAST BP <80 MM HG: CPT | Mod: CPTII,,, | Performed by: INTERNAL MEDICINE

## 2023-09-20 PROCEDURE — 3074F PR MOST RECENT SYSTOLIC BLOOD PRESSURE < 130 MM HG: ICD-10-PCS | Mod: CPTII,,, | Performed by: INTERNAL MEDICINE

## 2023-09-20 PROCEDURE — 1159F PR MEDICATION LIST DOCUMENTED IN MEDICAL RECORD: ICD-10-PCS | Mod: CPTII,,, | Performed by: INTERNAL MEDICINE

## 2023-09-20 PROCEDURE — 1159F MED LIST DOCD IN RCRD: CPT | Mod: CPTII,,, | Performed by: INTERNAL MEDICINE

## 2023-09-20 PROCEDURE — 3078F PR MOST RECENT DIASTOLIC BLOOD PRESSURE < 80 MM HG: ICD-10-PCS | Mod: CPTII,,, | Performed by: INTERNAL MEDICINE

## 2023-09-20 PROCEDURE — 99999 PR PBB SHADOW E&M-EST. PATIENT-LVL III: ICD-10-PCS | Mod: PBBFAC,,, | Performed by: INTERNAL MEDICINE

## 2023-09-20 PROCEDURE — 3008F PR BODY MASS INDEX (BMI) DOCUMENTED: ICD-10-PCS | Mod: CPTII,,, | Performed by: INTERNAL MEDICINE

## 2023-09-20 PROCEDURE — 99213 OFFICE O/P EST LOW 20 MIN: CPT | Mod: PBBFAC,PN | Performed by: INTERNAL MEDICINE

## 2023-09-20 RX ORDER — SUMATRIPTAN 50 MG/1
50 TABLET, FILM COATED ORAL
Qty: 30 TABLET | Refills: 1 | Status: SHIPPED | OUTPATIENT
Start: 2023-09-20

## 2023-09-20 RX ORDER — IBUPROFEN 800 MG/1
800 TABLET ORAL EVERY 8 HOURS PRN
COMMUNITY
Start: 2023-08-21

## 2023-09-20 RX ORDER — LANOLIN ALCOHOL/MO/W.PET/CERES
400 CREAM (GRAM) TOPICAL DAILY
Qty: 150 TABLET | Refills: 1 | Status: SHIPPED | OUTPATIENT
Start: 2023-09-20

## 2023-09-20 RX ORDER — RIBOFLAVIN (VITAMIN B2) 400 MG
400 TABLET ORAL DAILY
Qty: 150 TABLET | Refills: 1 | Status: SHIPPED | OUTPATIENT
Start: 2023-09-20

## 2023-09-20 NOTE — PROGRESS NOTES
GENERAL NEUROLOGY VISIT   09/20/2023  History:    Patient is a 48 y.o. with no significant past medical history coming for evaluation of headaches and memory issues.    Patient states that she started getting migraines when she was 13 years old.  Her headaches eventually stopped in her 20s.  She again started developing headaches in her 40s which from the around beginning of COVID.  Patient states that her headaches are usually on the left side or the back of the head.  Associated with photophobia phonophobia.  At times she experiences flashing lights in her visual fields.  She does notice significant nausea but no vomiting.  Patient usually takes ibuprofen 800 mg with minimal relief.  Headaches are throbbing in nature and lasts for days at times.  She at least gets 1-2 attacks a week.  Patient has to lay down in a dark room and is unable to carry on with her daily activities.  She notices some tearing of the eyes during these events.  Patient has never been tried on abortive medications apart from over-the-counter pain medication.  Can not point to any aggravating factors.     Regarding her cognitive issues, patient is having significant trouble with memory.  She states that she tends to forget the topic of conversation and has significant word-finding difficulty at times.  She states that she is having issues forgetting her prayers which she has known all her life. Patient having minimal issues around the house like forgetting reasons of entering the kitchen.  She also has issues remembering names and at times remembering her own name.  No issues with driving, has not gotten lost.  She is able to manage her own finances.  She lives with her son.    Patient gets around 4-5 hours of sleep and states that she has trouble both falling and staying asleep.  Melatonin has not helped in the past.  She states that she is experiencing significant anxiety and depression which has increased since COVID.  She has been started  on Cymbalta by her PCP which initially helped but is not working anymore.      Past Medical History:   Diagnosis Date    Abnormal Pap smear     Anemia     Anxiety 7/18/2013    Blood transfusion 2008       Past Surgical History:   Procedure Laterality Date    ADENOIDECTOMY      CERVICAL BIOPSY  W/ LOOP ELECTRODE EXCISION      DILATION AND CURETTAGE OF UTERUS      HYSTERECTOMY  02/20/2018    OVARIAN CYST REMOVAL      TONSILLECTOMY      WISDOM TOOTH EXTRACTION         Social History     Socioeconomic History    Marital status:      Spouse name: Ty    Number of children: 1   Occupational History    Occupation:    Tobacco Use    Smoking status: Never    Smokeless tobacco: Never   Substance and Sexual Activity    Alcohol use: Yes     Comment: 7-10 drinks weekly     Drug use: No    Sexual activity: Yes     Partners: Male     Birth control/protection: None   Social History Narrative    Has elliptical - 30min at at time, 4 times weekly      Social Determinants of Health     Financial Resource Strain: Low Risk  (6/13/2022)    Overall Financial Resource Strain (CARDIA)     Difficulty of Paying Living Expenses: Not hard at all   Food Insecurity: No Food Insecurity (6/13/2022)    Hunger Vital Sign     Worried About Running Out of Food in the Last Year: Never true     Ran Out of Food in the Last Year: Never true   Transportation Needs: No Transportation Needs (6/13/2022)    PRAPARE - Transportation     Lack of Transportation (Medical): No     Lack of Transportation (Non-Medical): No   Physical Activity: Insufficiently Active (6/13/2022)    Exercise Vital Sign     Days of Exercise per Week: 1 day     Minutes of Exercise per Session: 10 min   Stress: Stress Concern Present (6/13/2022)    [employer] [employer] - Occupational Stress Questionnaire     Feeling of Stress : Very much   Social Connections: Unknown (6/13/2022)    Social Connection and Isolation Panel [NHANES]     Frequency of  "Communication with Friends and Family: More than three times a week     Frequency of Social Gatherings with Friends and Family: Never     Active Member of Clubs or Organizations: No     Attends Club or Organization Meetings: Never     Marital Status:    Housing Stability: Low Risk  (6/13/2022)    Housing Stability Vital Sign     Unable to Pay for Housing in the Last Year: No     Number of Places Lived in the Last Year: 1     Unstable Housing in the Last Year: No       Review of patient's allergies indicates:  No Known Allergies    Current Outpatient Medications on File Prior to Visit   Medication Sig Dispense Refill    DULoxetine (CYMBALTA) 60 MG capsule Take 1 capsule (60 mg total) by mouth once daily. 30 capsule 11    vitamin D (VITAMIN D3) 1000 units Tab Take 1,000 Units by mouth once daily.      ibuprofen (ADVIL,MOTRIN) 800 MG tablet Take 800 mg by mouth every 8 (eight) hours as needed.       No current facility-administered medications on file prior to visit.        Family history:  Noncontributory    Review Of Systems     Constitutional Negative for fevers, chills, weigh loss   HEENT Negative for hearing loss, dysphagia, sore throat, diplopia   Respiratory Negative for shortness of breath, cough    Cardiovascular Negative for chest pain, palpitations    Gastrointestinal Negative for constipation, diarrhea, early satiety    Skin Negative for rashes    Musculoskeletal Negative for joint pains, myalgias.   Neurological See Above    Psychological Negative for sleep disturbances.    Heme/Lymph Negative for easy bruising, easy bleeding    Endocrine Negative for polyuria, polydypsia     Physical Exam:     Physical Examination  /71 (BP Location: Right arm, Patient Position: Sitting, BP Method: Medium (Automatic))   Pulse 76   Ht 5' 6" (1.676 m)   Wt 76.2 kg (168 lb)   LMP 02/01/2018 (Approximate)   SpO2 98%   BMI 27.12 kg/m²   Body mass index is 27.12 kg/m².    Neurological Exam  Mental Status: "   Alert and oriented x3  MOCA 28/30    ?CN: ?  II, III, IV, VI: PERRL, EOMI, no nystagmus, fundus not visualized due to inadequate dilation.?V: intact to fine touch, temperature, pain.?VII: symmetrical facial movement, nice smile, no drooping.?VIII: grossly intact to hearing.?IX, X: symmetrical palate, normal palatal elevation.?XI: 5/5 SCM and 5/5 trapezius.?XII: tongue midline, 5/5, no deviation or fasciculation.?                ?Motor:   5/5 throughout                Sensation: on both UEs and LEs    Intact in all modalities    ?Coordination:    ?Tremor: Absent.?    ??Gait:    Normal      Impression:   #migraine headache with aura, not intractable, without status migrainosus  - patient has primary headache disorder with migrainous features.  Currently has a around 8-10 headache days a month and does not qualify for preventative therapy.    #cognitive deficits  - major deficits in the memory domain of cognitive deficits per history as well as on [place].  We will obtain blood work to rule out metabolic causes and formal neuropsychological testing.  Poor mood symptoms including depressive mood could be contributing to this.    Plan:     - starting on Imitrex 50 mg as needed at the start of the headache.  Can repeat after 2 hours if the headache does not resolve.  Not to exceed more than 200 mg.  - starting on magnesium 400 mg and riboflavin 400 mg daily  - she will maintain a headache diary prior to next visit  - blood work with TSH, vitamin B12, MMA, folate, HbA1c.  - formal neuropsychological testing and evaluation  - patient to start therapy for her mood.  She will continue Cymbalta 60 mg daily until then.        RTC 3 months or sooner if needed    West Garcia MD  Neurology

## 2023-09-25 ENCOUNTER — PATIENT MESSAGE (OUTPATIENT)
Dept: NEUROLOGY | Facility: CLINIC | Age: 48
End: 2023-09-25
Payer: MEDICAID

## 2023-12-19 ENCOUNTER — PATIENT MESSAGE (OUTPATIENT)
Dept: NEUROLOGY | Facility: CLINIC | Age: 48
End: 2023-12-19
Payer: MEDICAID

## (undated) DEVICE — SYR 50CC LL

## (undated) DEVICE — PORT ACCESS 5MM W/120MM

## (undated) DEVICE — TIP RUMI GREEN DISPOSABLE6.7MM

## (undated) DEVICE — SCISSOR 5MMX35CM DIRECT DRIVE

## (undated) DEVICE — SOL 9P NACL IRR PIC IL

## (undated) DEVICE — SEE MEDLINE ITEM 156930

## (undated) DEVICE — SUT 0 VICRYL PLUS CT-1 27IN

## (undated) DEVICE — BAG TISS RETRV MONARCH 10MM

## (undated) DEVICE — SOL STRL WATER INJ 1000ML BG

## (undated) DEVICE — SUT MCRYL PLUS 4-0 PS2 27IN

## (undated) DEVICE — NDL INSUF ULTRA VERESS 120MM

## (undated) DEVICE — SET TRI-LUMEN FILTERED TUBE

## (undated) DEVICE — ELECTRODE REM PLYHSV RETURN 9

## (undated) DEVICE — PAD ABD 8X10 STERILE

## (undated) DEVICE — TIP RUMI KOH-EFFICIENT 3.5

## (undated) DEVICE — SEE MEDLINE ITEM 146296

## (undated) DEVICE — TROCAR ENDOPATH XCEL 11MM 10CM

## (undated) DEVICE — SET CYSTO IRRIGATION UNIV SPIK

## (undated) DEVICE — KIT WING PAD POSITIONING

## (undated) DEVICE — TROCAR ENDOPATH XCEL 5MM 7.5CM

## (undated) DEVICE — ENDOSTITCH POLYSORB 0 ES-9

## (undated) DEVICE — APPLICATOR ENDOSCOPIC

## (undated) DEVICE — SEALER LIGASURE LAP 37CM 5MM

## (undated) DEVICE — SOL NS 1000CC

## (undated) DEVICE — PACK LAPAROSCOPY BAPTIST

## (undated) DEVICE — SOL ELECTROLUBE ANTI-STIC

## (undated) DEVICE — SPONGE LAP 18X18 PREWASHED

## (undated) DEVICE — UNDERGLOVE BIOGEL PI SZ 6.5 LF

## (undated) DEVICE — SYR 10CC LUER LOCK

## (undated) DEVICE — SEE MEDLINE ITEM 152622

## (undated) DEVICE — GLOVE BIOGEL SKINSENSE PI 6.0

## (undated) DEVICE — IRRIGATOR ENDOSCOPY DISP.

## (undated) DEVICE — ENDOSTITCH INSTRUMENT